# Patient Record
Sex: MALE | Race: WHITE | NOT HISPANIC OR LATINO | Employment: FULL TIME | URBAN - METROPOLITAN AREA
[De-identification: names, ages, dates, MRNs, and addresses within clinical notes are randomized per-mention and may not be internally consistent; named-entity substitution may affect disease eponyms.]

---

## 2022-10-18 NOTE — TELEPHONE ENCOUNTER
FUTURE VISIT INFORMATION      FUTURE VISIT INFORMATION:    Date: 12/20/2022    Time: 3 PM     Location: [facility]   REFERRAL INFORMATION:    Referring provider:      Referring providers clinic:      Reason for visit/diagnosis  New per pat.  Requested referral from current SLP provider for dysphonia/difficulty speaking.  Med recs with [facility].  Confirmed [facility] location    RECORDS REQUESTED FROM:       Clinic name Comments Records Status Imaging Status   [facility] ([facility])  12/15/2021 Office visit with ALYSSA Bragg CNP    Care Everywhere    [facility]  9/21/2021 Office visit with Dr. Joyce Franco Care Everywhere    [facility]  2/7/19 Office visit with Gab Marcano PA-C Care Everywhere    [facility] 10/4/22, 9/13/22 Office note from Glory Guerrero MS,CCC-SLP Care Everywere

## 2022-12-19 NOTE — PROGRESS NOTES
[facility]   at the [facility]   Otolaryngology Clinic     Patient: Ravin Yañez    MRN: [id number]    : 1990    Age/Gender: 32 year old male  Date of Service: 2022  Rendering Provider:   Regina Reyes MD     Referring Provider   PCP: Liliana Zamora  Reason for Consultation   Dysphonia  Dysphagia  History   HISTORY OF PRESENT ILLNESS: Mr. Yañez is a 32 year old male who presents to us today with dysphonia and dysphagia.      Of note pertinent medical history significant for seizures, GERD, anxiety, depression, alcohol use disorder with alcoholic hepatitis and alcohol withdrawal delirium, and tobacco use disorder.    he presents today for evaluation. he reports:    Dysphonia  - good voice day  - yesterday it was completely gone  - tenses up  - has to strain to say more than a few words  - had couple of session with SLP in [place] which seemed minimally helpful  - started when he was 19 and gradually worsened  - no illness, trauma, or surgery prior to onset  - impacting work life and social life  - desperate for improvement  - grandmother had laryngeal dystonia  - on good days, voice is very deep, but sometimes he needs to speak with a higher pitch when he feels tension  - has always had slight tremor of body  - effort to speak is 3/10 today  - singing is harder than speaking    Dysphagia  - started less than one year ago  - occasionally, second bite of food gets stuck nursing home down throat  - has to force it the rest of the way  - happens once a month  - stops eating for a few minutes then takes very small bites until he overcomes it  - happens with solids  - no problems with liquids  - ulnar nerve issue in right hand  - working with neurologist  - no troubles walking    Dyspnea  - no asthma, no inhalers    Throat clearing/cough  - denies    GERD/LPRD   - took omeprazole and changed diet for few months and did not noticed improvement in voice    PAST MEDICAL  HISTORY: No past medical history on file.    PAST SURGICAL HISTORY: No past surgical history on file.    CURRENT MEDICATIONS:   Current Outpatient Medications:      desvenlafaxine (PRISTIQ) 100 MG 24 hr tablet, Take 100 mg by mouth every morning, Disp: , Rfl:      gabapentin (NEURONTIN) 300 MG capsule, TAKE ONE CAPSULE BY MOUTH FOUR TIMES A DAY, Disp: , Rfl:      SUBOXONE 8-2 MG per film, , Disp: , Rfl:     ALLERGIES: Patient has no known allergies.    SOCIAL HISTORY:    Social History     Socioeconomic History     Marital status: Single     Spouse name: Not on file     Number of children: Not on file     Years of education: Not on file     Highest education level: Not on file   Occupational History     Not on file   Tobacco Use     Smoking status: Not on file     Smokeless tobacco: Not on file   Substance and Sexual Activity     Alcohol use: Not on file     Drug use: Not on file     Sexual activity: Not on file   Other Topics Concern     Not on file   Social History Narrative     Not on file     Social Determinants of Health     Financial Resource Strain: Not on file   Food Insecurity: Not on file   Transportation Needs: Not on file   Physical Activity: Not on file   Stress: Not on file   Social Connections: Not on file   Intimate Partner Violence: Not on file   Housing Stability: Not on file         FAMILY HISTORY: No family history on file.  Non-contributory for problems with anesthesia    REVIEW OF SYSTEMS:   The patient was asked a 14 point review of systems regarding constitutional symptoms, eye symptoms, ears, nose, mouth, throat symptoms, cardiovascular symptoms, respiratory symptoms, gastrointestinal symptoms, genitourinary symptoms, musculoskeletal symptoms, integumentary symptoms, neurological symptoms, psychiatric symptoms, endocrine symptoms, hematologic/lymphatic symptoms, and allergic/ immunologic symptoms.   The pertinent factors have been included in the HPI and below.  Patient Supplied Answers to  Review of Systems   ENT ROS 12/20/2022   Constitutional: Unexplained fatigue   Neurology: Unexplained weakness   Psychology: Frequently feeling anxious   Ears, Nose, Throat: Sore throat, Trouble swallowing, Hoarseness   Gastrointestinal/Genitourinary: Constipation       Physical Examination   The patient underwent a physical examination as described below. The pertinent positive and negative findings are summarized after the description of the examination.  Constitutional: The patient's developmental and nutritional status was assessed. The patient's voice quality was assessed.  Head and Face: The head and face were inspected for deformities. The sinuses were palpated. The salivary glands were palpated. Facial muscle strength was assessed bilaterally.  Eyes: Extraocular movements and primary gaze alignment were assessed.  Ears, Nose, Mouth and Throat: The ears and nose were examined for deformities. The nasal septum, mucosa, and turbinates were inspected by anterior rhinoscopy. The lips, teeth, and gums were examined for abnormalities. The oral mucosa, tongue, palate, tonsils, lateral and posterior pharynx were inspected for the presence of asymmetry or mucosal lesions.    Neck: The tracheal position was noted, and the neck mass palpated to determine if there were any asymmetries, abnormal neck masses, thyromegally, or thyroid nodules.  Respiratory: The nature of the breathing and chest expansion/symmetry was observed.  Cardiovascular: The patient was examined to determine the presence of any edema or jugular venous distension.  Abdomen: The contour of the abdomen was noted.  Lymphatic: The patient was examined for infraclavicular lymphadenopathy.  Musculoskeletal: The patient was inspected for the presence of skeletal deformities.  Extremities: The extremities were examined for any clubbing or cyanosis.  Skin: The skin was examined for inflammatory or neoplastic conditions.  Neurologic: The patient's orientation,  mood, and affect were noted. The cranial nerve  functions were examined.  Other pertinent positive and negative findings on physical examination:      OC/OP: no lesions, uvula midline, soft palate elevates symmetrically   Neck: no lesions, no TH tenderness to palpation     All other physical examination findings were within normal limits and noncontributory.  Procedures   Video Laryngoscopy with Stroboscopy (CPT 53047)    Preoperative Diagnosis:  Dysphonia and throat symptoms  Postoperative Diagnosis: Dysphonia and throat symptoms  Indication:  Patient has new or persistent dysphonia and throat symptoms.  This requires evaluation by stroboscopy to fully delineate the laryngeal functioning; especially mucosal wave assessment, ultrasharp visualization of lesions on the vocal folds, and overall functioning of the larynx.  Details of Procedure: A 70 degree rigid telescopic laryngoscope or a distal chip flexible scope was used. The lighting was obtained via a light cable connected to a stroboscopic unit. The telescope was inserted either transorally or transnasally until the vocal folds could be visualized. The patient was instructed to sustain the vowel  ee  at a comfortable pitch and loudness as the voice was monitored by a microphone connected to a fundamental frequency tracker. This circuit tracked vocal periodicity, allowing the light to flash in synchrony with the glottal cycles. A setting on the stroboscope was set to change the phase of light strobing with relation to the vocal fundamental frequency, producing an image of 1 to 2 glottal cycles every second. The video images were recorded for analysis. Use of the variable speed, slow and stop scan allowed careful study of pertinent segments of laryngeal function to increase accuracy of clinical assessments of function and dysfunction.  In particular, features of glottal closure, mucosal wave on the vocal fold cover and laryngeal symmetry were analyzed. Lastly, the  patient was asked to phonate speech samples and auditory/perceptual evaluation of voice and resonance were performed.  The vocal quality was carefully evaluated for hoarseness, breathiness, loudness, phrase length and intelligibility to determine the source of dysphonia.    Findings:      B. LARYNGOVIDEOSTROBOSCOPY   Anatomic/Physiological Deviations:  LNC, intention tremor with phonation, supraglottic hyperfunction  Mucosal wave: Right:  No restriction     Left: No restriction  Bilateral Vocal Fold Vibration: Symmetric  Vocal Process: Right: No restriction    Left:  No restriction  Vocal Fold closure: Complete glottal closure    Complication(s): None  Disposition: Patient tolerated the procedure well          Fiberoptic Endoscopic Evaluation of Swallowing (CPT 71199)  and Interpretation of Swallowing (CPT 82550)    Indications: See above notes for complete history and physical. Patient complains of dysphagia to solids and/or there is suggestion on history and endoscopic exam of the presence of dysphagia causing medical complaints.  Swallowing evaluation is being performed to assess the presence and degree of dysphagia, and to recommend a safe diet.     Pulmonary Status:  No PNA   Current Diet:              regular                                        thin liquids      Consistency Amounts:  Thin Liquid: sip   Puree: 1 tsp  Solid: cookies            Positioning: upright in a chair  Oral Peripheral Exam: See physical exam section.  Anatomic Notes: See Videostroboscopy report for assessment of anatomy and laryngeal functioning  Pharyngeal secretions prior to administration of liquid or food: No  Oral Phase Abnormal Findings: No abnormal behavior observed  Behavioral Adaptations: No abnormal behavior observed  Pharyngeal Phase Abnormal Findings: no penetration, no aspiration    Recommended Diet:  regular                                        thin liquids             Review of Relevant Clinical Data   I personally  reviewed:  Notes: Glory Guerrero MS,CCC-SLP 10/4/22, 9/13/22  History:  Patient is a 32 Y male who presents with dysphonia. Pt has not seen an ENT recently regarding this voice problem. Patient reports having an unpredictable voice. Sometimes he doesn't have any voice. He has adapted by avoiding some conversations because it takes so much effort to use his voice. Voice is worse on the phone. He feels he has better voice when he yells or whispers. Voice is worse as the day progresses. Patient reported the voice changes began approximately 13 years ago when he was age 19. Patient questions if he may have laryngeal dystonia; his paternal grandmother had this diagnosis and was eventually unable to talk due to the diagnosis.   Impression:  Upon evaluation today, patient presented with moderate voice problem. Differential diagnosis includes muscle tension dysphonia, vocal tremor, and spasmodic dysphonia. Suspect patient may have a combination of the diagnoses. He was stimulable for improved vocal quality with trials using a more breathy vocal quality. Prognosis for improvement in vocal function is fair, if patient consistently attends treatment, increases vocal hygiene behaviors, and complies with home program.  Rehabilitation potential: Good , if patient consistently attends treatment to learn and use compensatory techniques and to receive direct treatment of deficit areas.  Barriers to Rehabilitation: None    Joyce Franco MD 9/21/21  Ravin Yañez is a 31 Y male presenting to Urgent Care today for evaluation of a change in his voice x several weeks. Patient states that he gets this periodically but they usually goes away. He reports having an endoscopy in the past and has had antibiotics and antifungal prescriptions with temporary relief. Patient adds that his last bout of these symptoms lasted for approximately 2.5 months.     Radiology:    Pathology:    Procedures:    Labs:  No results found for: TSH  No  "results found for: NA, CO2, BUN, CREAT, GLUCOSE, PHOS  No results found for: WBC, HGB, HCT, MCV, PLT  No results found for: PT, PTT, INR  No results found for: KEREN  No components found for: RHEUMATOIDFACTOR,  RF  No results found for: CRP  No components found for: CKTOT, URICACID  No components found for: C3, C4, DSDNAAB, NDNAABIFA  No results found for: MPOAB    Patient reported Quality of Life (QOL) Measures   Patient Supplied Answers To VHI Questionnaire  Voice Handicap Index (VHI-10) 12/20/2022   My voice makes it difficult for people to hear me 2   People have difficulty understanding me in a noisy room 2   My voice difficulties restrict my personal and social life.  4   I feel left out of conversations because of my voice 4   My voice problem causes me to lose income 3   I feel as though I have to strain to produce voice 4   The clarity of my voice is unpredictable 4   My voice problem upsets me 4   My voice makes me feel handicapped 4   People ask, \"What's wrong with your voice?\" 2   VHI-10 33         Patient Supplied Answers To EAT Questionnaire  Eating Assessment Tool (EAT-10) 12/20/2022   My swallowing problem has caused me to lose weight 0   My swallowing problem interferes with my ability to go out for meals 0   Swallowing liquids takes extra effort 0   Swallowing solids takes extra effort 2   Swallowing pills takes extra effort 0   Swallowing is painful 0   The pleasure of eating is affected by my swallowing 2   When I swallow food sticks in my throat 0   I cough when I eat 0   Swallowing is stressful 1   EAT-10 5         Patient Supplied Answers To CSI Questionnaire  Cough Severity Index (CSI) 12/20/2022   My cough is worse when I lie down 0   My coughing problem causes me to restrict my personal and social life 0   I tend to avoid places because of my cough problem 0   I feel embarrassed because of my coughing problem 0   People ask, ''What's wrong?'' because I cough a lot 0   I run out of air when I " cough 0   My coughing problem affects my voice 0   My coughing problem limits my physical activity 0   My coughing problem upsets me 0   People ask me if I am sick because I cough a lot 0   CSI Score 0         @[email or web address]@    Impression & Plan     IMPRESSION: Mr. Yañez is a 32 year old male who is being seen for the followin. Dysphonia  - feels tension and difficulty speaking  - onset 13 years ago and gradually worsening since  - good days and bad days  - feels that it is impacting his work and social life  - scope shows intention tremor with phonation, supraglottic hyperfunction  - symptoms due to muscle tension dysphonia and intention tremor on phonation  - discussed starting with voice therapy  Plan  - voice therapy    2. Dysphagia  - feels food get stuck once a month early in meals while eating solids  - ongoing for 1 year  - FEES evaluation shows no penetration no aspiration  - symptoms due to muscle tension dysphagia  - recommended therapy  Plan  - voice therapy    RETURN VISIT: as needed after therapy    Thank you for the kind referral and for allowing me to share in the care of Mr. Yañez. If you have any questions, please do not hesitate to contact me.    Scribe Disclosure:  I, Emile Dinh, am serving as a scribe to document services personally performed by Regina Reyes MD based on data collection and the provider's statements to me.     Regina Reyes MD    Laryngology    [facility]  Department of  Otolaryngology - Head and Neck Surgery  Clinics & Surgery Center  [address] [place] [id number]  Appointment line: [phone]  Fax: [phone]  [email or web address]   Answers for HPI/ROS submitted by the patient on 2022  VPCMEAN: 1.88

## 2022-12-20 ENCOUNTER — OFFICE VISIT (OUTPATIENT)
Dept: OTOLARYNGOLOGY | Facility: CLINIC | Age: 32
End: 2022-12-20
Payer: COMMERCIAL

## 2022-12-20 ENCOUNTER — PRE VISIT (OUTPATIENT)
Dept: OTOLARYNGOLOGY | Facility: CLINIC | Age: 32
End: 2022-12-20

## 2022-12-20 ENCOUNTER — THERAPY VISIT (OUTPATIENT)
Dept: SPEECH THERAPY | Facility: CLINIC | Age: 32
End: 2022-12-20
Payer: COMMERCIAL

## 2022-12-20 VITALS
WEIGHT: 242 LBS | OXYGEN SATURATION: 100 % | RESPIRATION RATE: 16 BRPM | BODY MASS INDEX: 30.09 KG/M2 | HEART RATE: 63 BPM | TEMPERATURE: 98.2 F | DIASTOLIC BLOOD PRESSURE: 78 MMHG | SYSTOLIC BLOOD PRESSURE: 125 MMHG | HEIGHT: 75 IN

## 2022-12-20 DIAGNOSIS — J38.7 LARYNGEAL HYPERFUNCTION: ICD-10-CM

## 2022-12-20 DIAGNOSIS — R13.10 DYSPHAGIA, UNSPECIFIED TYPE: Primary | ICD-10-CM

## 2022-12-20 DIAGNOSIS — R49.0 DYSPHONIA: ICD-10-CM

## 2022-12-20 DIAGNOSIS — R13.12 OROPHARYNGEAL DYSPHAGIA: Primary | ICD-10-CM

## 2022-12-20 DIAGNOSIS — R49.0 DYSPHONIA: Primary | ICD-10-CM

## 2022-12-20 PROCEDURE — 92612 ENDOSCOPY SWALLOW (FEES) VID: CPT | Mod: GN | Performed by: OTOLARYNGOLOGY

## 2022-12-20 PROCEDURE — 31579 LARYNGOSCOPY TELESCOPIC: CPT | Mod: 51 | Performed by: OTOLARYNGOLOGY

## 2022-12-20 PROCEDURE — 92524 BEHAVRAL QUALIT ANALYS VOICE: CPT | Mod: GN | Performed by: SPEECH-LANGUAGE PATHOLOGIST

## 2022-12-20 PROCEDURE — 92610 EVALUATE SWALLOWING FUNCTION: CPT | Mod: GN | Performed by: SPEECH-LANGUAGE PATHOLOGIST

## 2022-12-20 PROCEDURE — 92613 ENDOSCOPY SWALLOW (FEES) I&R: CPT | Performed by: OTOLARYNGOLOGY

## 2022-12-20 PROCEDURE — 99204 OFFICE O/P NEW MOD 45 MIN: CPT | Mod: 25 | Performed by: OTOLARYNGOLOGY

## 2022-12-20 RX ORDER — BUPRENORPHINE HYDROCHLORIDE, NALOXONE HYDROCHLORIDE 8; 2 MG/1; MG/1
FILM, SOLUBLE BUCCAL; SUBLINGUAL
COMMUNITY
Start: 2022-12-17

## 2022-12-20 RX ORDER — GABAPENTIN 300 MG/1
CAPSULE ORAL
COMMUNITY
Start: 2022-12-07

## 2022-12-20 RX ORDER — DESVENLAFAXINE 100 MG/1
100 TABLET, EXTENDED RELEASE ORAL EVERY MORNING
COMMUNITY
Start: 2022-12-07

## 2022-12-20 ASSESSMENT — PAIN SCALES - GENERAL: PAINLEVEL: NO PAIN (0)

## 2022-12-20 NOTE — LETTER
2022       RE: Ravin Yañez  [address]  [place] [address]     Dear Colleague,    Thank you for referring your patient, Ravin Yañez, to the [facility]. Please see a copy of my visit note below.        [facility]   at the [facility]   Otolaryngology Clinic     Patient: Ravin Yañez    MRN: [id number]    : 1990    Age/Gender: 32 year old male  Date of Service: 2022  Rendering Provider:   Regina Reyes MD     Referring Provider   PCP: Liliana Zamora  Reason for Consultation   Dysphonia  Dysphagia  History   HISTORY OF PRESENT ILLNESS: Mr. Yañez is a 32 year old male who presents to us today with dysphonia and dysphagia.      Of note pertinent medical history significant for seizures, GERD, anxiety, depression, alcohol use disorder with alcoholic hepatitis and alcohol withdrawal delirium, and tobacco use disorder.    he presents today for evaluation. he reports:    Dysphonia  - good voice day  - yesterday it was completely gone  - tenses up  - has to strain to say more than a few words  - had couple of session with SLP in [place] which seemed minimally helpful  - started when he was 19 and gradually worsened  - no illness, trauma, or surgery prior to onset  - impacting work life and social life  - desperate for improvement  - grandmother had laryngeal dystonia  - on good days, voice is very deep, but sometimes he needs to speak with a higher pitch when he feels tension  - has always had slight tremor of body  - effort to speak is 3/10 today  - singing is harder than speaking    Dysphagia  - started less than one year ago  - occasionally, second bite of food gets stuck group home down throat  - has to force it the rest of the way  - happens once a month  - stops eating for a few minutes then takes very small bites until he overcomes  it  - happens with solids  - no problems with liquids  - ulnar nerve issue in right hand  - working with neurologist  - no troubles walking    Dyspnea  - no asthma, no inhalers    Throat clearing/cough  - denies    GERD/LPRD   - took omeprazole and changed diet for few months and did not noticed improvement in voice    PAST MEDICAL HISTORY: No past medical history on file.    PAST SURGICAL HISTORY: No past surgical history on file.    CURRENT MEDICATIONS:   Current Outpatient Medications:      desvenlafaxine (PRISTIQ) 100 MG 24 hr tablet, Take 100 mg by mouth every morning, Disp: , Rfl:      gabapentin (NEURONTIN) 300 MG capsule, TAKE ONE CAPSULE BY MOUTH FOUR TIMES A DAY, Disp: , Rfl:      SUBOXONE 8-2 MG per film, , Disp: , Rfl:     ALLERGIES: Patient has no known allergies.    SOCIAL HISTORY:    Social History     Socioeconomic History     Marital status: Single     Spouse name: Not on file     Number of children: Not on file     Years of education: Not on file     Highest education level: Not on file   Occupational History     Not on file   Tobacco Use     Smoking status: Not on file     Smokeless tobacco: Not on file   Substance and Sexual Activity     Alcohol use: Not on file     Drug use: Not on file     Sexual activity: Not on file   Other Topics Concern     Not on file   Social History Narrative     Not on file     Social Determinants of Health     Financial Resource Strain: Not on file   Food Insecurity: Not on file   Transportation Needs: Not on file   Physical Activity: Not on file   Stress: Not on file   Social Connections: Not on file   Intimate Partner Violence: Not on file   Housing Stability: Not on file         FAMILY HISTORY: No family history on file.  Non-contributory for problems with anesthesia    REVIEW OF SYSTEMS:   The patient was asked a 14 point review of systems regarding constitutional symptoms, eye symptoms, ears, nose, mouth, throat symptoms, cardiovascular symptoms, respiratory  symptoms, gastrointestinal symptoms, genitourinary symptoms, musculoskeletal symptoms, integumentary symptoms, neurological symptoms, psychiatric symptoms, endocrine symptoms, hematologic/lymphatic symptoms, and allergic/ immunologic symptoms.   The pertinent factors have been included in the HPI and below.  Patient Supplied Answers to Review of Systems  [facility] ENT ROS 12/20/2022   Constitutional: Unexplained fatigue   Neurology: Unexplained weakness   Psychology: Frequently feeling anxious   Ears, Nose, Throat: Sore throat, Trouble swallowing, Hoarseness   Gastrointestinal/Genitourinary: Constipation       Physical Examination   The patient underwent a physical examination as described below. The pertinent positive and negative findings are summarized after the description of the examination.  Constitutional: The patient's developmental and nutritional status was assessed. The patient's voice quality was assessed.  Head and Face: The head and face were inspected for deformities. The sinuses were palpated. The salivary glands were palpated. Facial muscle strength was assessed bilaterally.  Eyes: Extraocular movements and primary gaze alignment were assessed.  Ears, Nose, Mouth and Throat: The ears and nose were examined for deformities. The nasal septum, mucosa, and turbinates were inspected by anterior rhinoscopy. The lips, teeth, and gums were examined for abnormalities. The oral mucosa, tongue, palate, tonsils, lateral and posterior pharynx were inspected for the presence of asymmetry or mucosal lesions.    Neck: The tracheal position was noted, and the neck mass palpated to determine if there were any asymmetries, abnormal neck masses, thyromegally, or thyroid nodules.  Respiratory: The nature of the breathing and chest expansion/symmetry was observed.  Cardiovascular: The patient was examined to determine the presence of any edema or jugular venous distension.  Abdomen: The contour of the abdomen was  noted.  Lymphatic: The patient was examined for infraclavicular lymphadenopathy.  Musculoskeletal: The patient was inspected for the presence of skeletal deformities.  Extremities: The extremities were examined for any clubbing or cyanosis.  Skin: The skin was examined for inflammatory or neoplastic conditions.  Neurologic: The patient's orientation, mood, and affect were noted. The cranial nerve  functions were examined.  Other pertinent positive and negative findings on physical examination:      OC/OP: no lesions, uvula midline, soft palate elevates symmetrically   Neck: no lesions, no TH tenderness to palpation     All other physical examination findings were within normal limits and noncontributory.  Procedures   Video Laryngoscopy with Stroboscopy (CPT 44953)    Preoperative Diagnosis:  Dysphonia and throat symptoms  Postoperative Diagnosis: Dysphonia and throat symptoms  Indication:  Patient has new or persistent dysphonia and throat symptoms.  This requires evaluation by stroboscopy to fully delineate the laryngeal functioning; especially mucosal wave assessment, ultrasharp visualization of lesions on the vocal folds, and overall functioning of the larynx.  Details of Procedure: A 70 degree rigid telescopic laryngoscope or a distal chip flexible scope was used. The lighting was obtained via a light cable connected to a stroboscopic unit. The telescope was inserted either transorally or transnasally until the vocal folds could be visualized. The patient was instructed to sustain the vowel  ee  at a comfortable pitch and loudness as the voice was monitored by a microphone connected to a fundamental frequency tracker. This circuit tracked vocal periodicity, allowing the light to flash in synchrony with the glottal cycles. A setting on the stroboscope was set to change the phase of light strobing with relation to the vocal fundamental frequency, producing an image of 1 to 2 glottal cycles every second. The video  images were recorded for analysis. Use of the variable speed, slow and stop scan allowed careful study of pertinent segments of laryngeal function to increase accuracy of clinical assessments of function and dysfunction.  In particular, features of glottal closure, mucosal wave on the vocal fold cover and laryngeal symmetry were analyzed. Lastly, the patient was asked to phonate speech samples and auditory/perceptual evaluation of voice and resonance were performed.  The vocal quality was carefully evaluated for hoarseness, breathiness, loudness, phrase length and intelligibility to determine the source of dysphonia.    Findings:      B. LARYNGOVIDEOSTROBOSCOPY   Anatomic/Physiological Deviations:  LNC, intention tremor with phonation, supraglottic hyperfunction  Mucosal wave: Right:  No restriction     Left: No restriction  Bilateral Vocal Fold Vibration: Symmetric  Vocal Process: Right: No restriction    Left:  No restriction  Vocal Fold closure: Complete glottal closure    Complication(s): None  Disposition: Patient tolerated the procedure well          Fiberoptic Endoscopic Evaluation of Swallowing (CPT 69070)  and Interpretation of Swallowing (CPT 71778)    Indications: See above notes for complete history and physical. Patient complains of dysphagia to solids and/or there is suggestion on history and endoscopic exam of the presence of dysphagia causing medical complaints.  Swallowing evaluation is being performed to assess the presence and degree of dysphagia, and to recommend a safe diet.     Pulmonary Status:  No PNA   Current Diet:              regular                                        thin liquids      Consistency Amounts:  Thin Liquid: sip   Puree: 1 tsp  Solid: cookies            Positioning: upright in a chair  Oral Peripheral Exam: See physical exam section.  Anatomic Notes: See Videostroboscopy report for assessment of anatomy and laryngeal functioning  Pharyngeal secretions prior to  administration of liquid or food: No  Oral Phase Abnormal Findings: No abnormal behavior observed  Behavioral Adaptations: No abnormal behavior observed  Pharyngeal Phase Abnormal Findings: no penetration, no aspiration    Recommended Diet:  regular                                        thin liquids             Review of Relevant Clinical Data   I personally reviewed:  Notes: Glory Guerrero MS,CCC-SLP 10/4/22, 9/13/22  History:  Patient is a 32 Y male who presents with dysphonia. Pt has not seen an ENT recently regarding this voice problem. Patient reports having an unpredictable voice. Sometimes he doesn't have any voice. He has adapted by avoiding some conversations because it takes so much effort to use his voice. Voice is worse on the phone. He feels he has better voice when he yells or whispers. Voice is worse as the day progresses. Patient reported the voice changes began approximately 13 years ago when he was age 19. Patient questions if he may have laryngeal dystonia; his paternal grandmother had this diagnosis and was eventually unable to talk due to the diagnosis.   Impression:  Upon evaluation today, patient presented with moderate voice problem. Differential diagnosis includes muscle tension dysphonia, vocal tremor, and spasmodic dysphonia. Suspect patient may have a combination of the diagnoses. He was stimulable for improved vocal quality with trials using a more breathy vocal quality. Prognosis for improvement in vocal function is fair, if patient consistently attends treatment, increases vocal hygiene behaviors, and complies with home program.  Rehabilitation potential: Good , if patient consistently attends treatment to learn and use compensatory techniques and to receive direct treatment of deficit areas.  Barriers to Rehabilitation: None    Joyce Franco MD 9/21/21  Ravin Yañez is a 31 Y male presenting to Urgent Care today for evaluation of a change in his voice x several weeks.  "Patient states that he gets this periodically but they usually goes away. He reports having an endoscopy in the past and has had antibiotics and antifungal prescriptions with temporary relief. Patient adds that his last bout of these symptoms lasted for approximately 2.5 months.     Radiology:    Pathology:    Procedures:    Labs:  No results found for: TSH  No results found for: NA, CO2, BUN, CREAT, GLUCOSE, PHOS  No results found for: WBC, HGB, HCT, MCV, PLT  No results found for: PT, PTT, INR  No results found for: KEREN  No components found for: RHEUMATOIDFACTOR,  RF  No results found for: CRP  No components found for: CKTOT, URICACID  No components found for: C3, C4, DSDNAAB, NDNAABIFA  No results found for: MPOAB    Patient reported Quality of Life (QOL) Measures   Patient Supplied Answers To VHI Questionnaire  Voice Handicap Index (VHI-10) 12/20/2022   My voice makes it difficult for people to hear me 2   People have difficulty understanding me in a noisy room 2   My voice difficulties restrict my personal and social life.  4   I feel left out of conversations because of my voice 4   My voice problem causes me to lose income 3   I feel as though I have to strain to produce voice 4   The clarity of my voice is unpredictable 4   My voice problem upsets me 4   My voice makes me feel handicapped 4   People ask, \"What's wrong with your voice?\" 2   VHI-10 33         Patient Supplied Answers To EAT Questionnaire  Eating Assessment Tool (EAT-10) 12/20/2022   My swallowing problem has caused me to lose weight 0   My swallowing problem interferes with my ability to go out for meals 0   Swallowing liquids takes extra effort 0   Swallowing solids takes extra effort 2   Swallowing pills takes extra effort 0   Swallowing is painful 0   The pleasure of eating is affected by my swallowing 2   When I swallow food sticks in my throat 0   I cough when I eat 0   Swallowing is stressful 1   EAT-10 5         Patient Supplied Answers " To CSI Questionnaire  Cough Severity Index (CSI) 2022   My cough is worse when I lie down 0   My coughing problem causes me to restrict my personal and social life 0   I tend to avoid places because of my cough problem 0   I feel embarrassed because of my coughing problem 0   People ask, ''What's wrong?'' because I cough a lot 0   I run out of air when I cough 0   My coughing problem affects my voice 0   My coughing problem limits my physical activity 0   My coughing problem upsets me 0   People ask me if I am sick because I cough a lot 0   CSI Score 0         @[email or web address]@    Impression & Plan     IMPRESSION: Mr. Yañez is a 32 year old male who is being seen for the followin. Dysphonia  - feels tension and difficulty speaking  - onset 13 years ago and gradually worsening since  - good days and bad days  - feels that it is impacting his work and social life  - scope shows intention tremor with phonation, supraglottic hyperfunction  - symptoms due to muscle tension dysphonia and intention tremor on phonation  - discussed starting with voice therapy  Plan  - voice therapy    2. Dysphagia  - feels food get stuck once a month early in meals while eating solids  - ongoing for 1 year  - FEES evaluation shows no penetration no aspiration  - symptoms due to muscle tension dysphagia  - recommended therapy  Plan  - voice therapy    RETURN VISIT: as needed after therapy    Thank you for the kind referral and for allowing me to share in the care of Mr. Yañez. If you have any questions, please do not hesitate to contact me.    Scribe Disclosure:  I, Emile Dinh, am serving as a scribe to document services personally performed by Regina eRyes MD based on data collection and the provider's statements to me.     Regina Reyes MD    Laryngology    [facility]  Department of  Otolaryngology - Head and Neck Surgery  Clinics & Surgery Center  [address] [place] [address],  [place] [address]  Appointment line: [phone]  Fax: [phone]  [email or web address]   Answers for HPI/ROS submitted by the patient on 12/20/2022  VPCMEAN: 1.88

## 2022-12-20 NOTE — PROGRESS NOTES
"[facility]  Clinical Voice and Upper Airway Evaluation Report    Patient's Name: Ravin Yañez  Date of Evaluation: 12/20/2022  Providing SLP: Aurora Velasquez MS CCC-SLP  Seen in Conjunction With: Regina Reyes MD - Tri Abreu, CCC-SLP  Referring Provider and Facility: Glory Nashmelidagary, MS [facility]-SLP - [facility] in [place]  Insurance Coverage: [facility]  Chief Complaint: Dysphonia  Assessment / Treatment Time: 1 hour  Evaluation Location: [facility] [place]      Patient History: Ravin is a 32-year-old male who presents today for evaluation of dysphonia.     Dysphonia:     Onset: age 19 - denies inciting factor    Family history: grandmother has laryngeal dystonia - Ravin reports that she sounded \"shaky\"    Complaints  o Tense, strained throat  o Sometimes sounds breathy and raspy  o Needs to speak monotone  o More difficulties speaking and increased effort  o 80% of the time  o Voice was almost gone the other day; however, it is doing well today  o Notes that it can be completely normal a few days per year  o Highly fluctuating  o Worse later in the day and on the phone  o Whispering is very hard  o Singing is difficult  o Sounds better when speaking loudly  o Burning, aching sensation in throat after a certain amount of voice use  o Sometimes feels like he can control his voice    Previous treatments  o [place] [facility] provided reflux medications - not helpful - Ravin never had reflux symptoms in the first place  o Worked with SLP in [place]  - Vocal tremor noted with significant strain noted  - Worked on easy onsets and felt that this was very mildly helpful  - Attended only 2 sessions    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    Dyspnea: denies issues or pulmonary changes    Dysphagia:     About 1 year ago    Rare instances (monthly) where swallow \"reflex will feel interrupted\"     Occurs more with solids     Will take a break from eating and go back to " "it    No dysphagia with liquids    Cough / Throat Clearing: denies    Alcohol Intake: history of multiple ED visits for intoxication    Occupation / School Status: works in construction and does very little talking - previously worked in position which required talking on the phones frequently        Quality of Life Questionnaires:    Patient Supplied Answers To Last 2 VHI Questionnaires  Voice Handicap Index (VHI-10) 12/20/2022   My voice makes it difficult for people to hear me 2   People have difficulty understanding me in a noisy room 2   My voice difficulties restrict my personal and social life.  4   I feel left out of conversations because of my voice 4   My voice problem causes me to lose income 3   I feel as though I have to strain to produce voice 4   The clarity of my voice is unpredictable 4   My voice problem upsets me 4   My voice makes me feel handicapped 4   People ask, \"What's wrong with your voice?\" 2   VHI-10 33     Patient Supplied Answers To Last 2 CSI Questionnaires  Cough Severity Index (CSI) 12/20/2022   My cough is worse when I lie down 0   My coughing problem causes me to restrict my personal and social life 0   I tend to avoid places because of my cough problem 0   I feel embarrassed because of my coughing problem 0   People ask, ''What's wrong?'' because I cough a lot 0   I run out of air when I cough 0   My coughing problem affects my voice 0   My coughing problem limits my physical activity 0   My coughing problem upsets me 0   People ask me if I am sick because I cough a lot 0   CSI Score 0     Patient Supplied Answers To Last 2 EAT Questionnaires  Eating Assessment Tool (EAT-10) 12/20/2022   My swallowing problem has caused me to lose weight 0   My swallowing problem interferes with my ability to go out for meals 0   Swallowing liquids takes extra effort 0   Swallowing solids takes extra effort 2   Swallowing pills takes extra effort 0   Swallowing is painful 0   The pleasure of " "eating is affected by my swallowing 2   When I swallow food sticks in my throat 0   I cough when I eat 0   Swallowing is stressful 1   EAT-10 5       Perceptual Analysis (04021): Evaluation of Voice / Speech / Non-Communicative Laryngeal Behaviors    The GRBAS is a perceptual rating of voice change. 0 indicated no impairment, 3 indicates a severe impairment. \"C\" and \"I\" may be used to signify if these feature was observed consistently or intermittently respectively. This is a rating based on clinical judgement of disordered voice quality.  G ( 1 - C ) General Dysphonia     R ( 1 - C ) Roughness     B ( 0 - C ) Breathiness     A ( 0 - C ) Asthenia     S ( 1 - C ) Strain  Additional information: mild glottal ma noted during connected speech      Stimuli for differential diagnosis of spasmodic dysphonia and vocal tremor:    Sustained vowel:     No tremor detected during sustained /i/ and /a/ for several seconds    Voiced-loaded stimuli (e.g. \"We were away a year ago,\" counting from 80-90):    Reports that counting from 80-90 felt easier than 50-60    No task-specificity/tremor/spasms noted    Voiceless-loaded stimuli (e.g. \"How hard did he hit him?,\" counting from 50-60):    Reports that CAPE-V sentence \"How hard did he hit him?\" felt easier than the others    No task-specificity/tremor/spasms noted    Singing:    Voice quality was similar to connected speech with no tremor or spasming noted    *Validity of this measure may be slightly reduced when performed via acoustic signal from virtual visit*    Laryngeal palpation:     Thyrohyoid space: denies tenderness    Additional observations:     Coughing / throat clearing: some mild instances noted during voice tasks    Breathing patterns: appropriate    Overt tension: WNL    Habitual pitch: slightly lowered compared to age- and gender-matched peers    Pitch range: appropriate for age- and gender-matched peers    Resonance: mildly back-focused    Loudness: slightly " "increased      Laryngoscopy with stroboscopy:    Provider performing exam: Regina Reyes MD    Informed consent: Informed consent was obtained, which includes potential side-effects, risks, and benefits of the procedure.     Anesthetic: Topical anesthesia with 0.25% phenylephrine was applied the nostrils bilaterally. Viscous lidocaine 4% was applied to the tip of the endoscope.    Scope type: A distal chip flexible laryngoscope was passed through the nare with halogen and xenon light source(s).     The laryngeal and pharyngeal structures were evaluated for gross appearance, mobility, function, and focal lesions / abnormalities of the associated mucosa.    R Arytenoid Abduction / Adduction: symmetrical and timely ab/adduction with appropriate range of motion    L Arytenoid Abduction / Adduction: \" \"    Vocal Fold Elongation: appropriate    Subglottis Appearance: WNL    Mediolateral Compression: mild with connected speech    Anteroposterior Compression: noted with lower pitches    Appearance: mild, rapid shaking of the larynx noted, consistent with tension tremor; mild omega-shaped epiglottis frequently canted slightly to the R    Secretions: appropriate    Left (L) Vocal Fold Edge: mild, diffuse erythema of the superior surface    Right (R) Vocal Fold Edge: \" \"    Narrow Band Imaging: WNL    Glottic Closure: complete    Phase Closure: predominantly closed phase    Vertical Level of Approximation: WNL    L Amplitude: reduced due to straining    R Amplitude: \" \"    L Mucosal Wave:  WNL    R Mucosal Wave: WNL    Phase Symmetry: mild and intermittently vertical chasing wave during low pitch phonation    Periodicity: periodic    Therapeutic Probes: perceptual straining and laryngeal hyperfunction reduced with humming and increased airflow    FEES: Evaluation was performed by Tri Abreu, CCC-SLP, and Dr. Reyes. Findings demonstrate mild oropharyngeal swallow with no penetration or aspiration of liquid, purée, or solids.  " Findings are most consistent with muscle tension dysphagia.       Impressions and Plan:     Ravin is a 32-year-old male presenting today with dysphonia (R49.0) secondary to laryngeal hyperfunction (J38.7). Perceptually, his voice is mildly rough and strained with no observed vocal tremor or indication of spasming during sustained vowels, connected speech, or specific task-specific stimuli; however, Ravin reports that his voice is doing especially well today, so it may not be representative of other instances when he is demonstrating increased voice difficulties. Laryngoscopy today demonstrated excessive laryngeal tension indicated by mediolateral and anteroposterior compression during voice use and rapid, fast shaking of the laryngeal structures.  This is more indicative of a tension-based tremor rather than neurologic during today's evaluation. Therefore, additional sessions of voice therapy has been recommended to aid in the differential diagnosis process, as well as to continue to progress that he is made with the previous speech-language pathologist. Ravin plans to make recordings of himself when his voice is doing more poorly, as his voice is doing well today. Ravin is in agreement with this plan of care.        Goals:    Coordinate phonatory and respiratory subsystems, demonstrating adequate independence for activities of daily communication     Decrease extrinsic laryngeal muscle activity during communication events     Improve voice quality in all activities of daily living using, as measured by a minimum of a 50% point reduction on the Voice Handicap Index-10 or Singing VHI    Demonstrate appropriate vocal hygiene including, but not limited to, adequate hydration, avoiding vocal abuse, integrating behavioral and dietary changes for GERD into their daily life?.     Incorporate behaviors to reduce irritation and promote laryngeal healing and prevent recurrence.?     Implement behavioral strategies to reduce  laryngopharyngeal reflux. ?     Independently maintain a forward focus voice placement 90% of the time during conversational speech.    Independently perform the Vocal Function Exercises, rebalancing respiration, phonation, and resonance 90% of the time    Perform High Level Phonation and Pitch Range Navigation Exercises independently 90% of the time      Billed Procedures:    No charge facility fee    Perceptual voice assessment [id number]      Thank you for allowing me to participate in this patient's care,    Aurora Velasquez MS CCC-SLP  Speech-Language Pathologist  [facility] - Department of Otolaryngology  [facility]  [email or web address]  [phone]    *This note may have been completed using [id number] dictation software, so errors may exist. Please contact me for clarification if needed*

## 2022-12-20 NOTE — PATIENT INSTRUCTIONS
1.  You were seen in the ENT Clinic today by . If you have any questions or concerns after your appointment, please call [phone]. Press option #1 for scheduling related needs. Press option #3 for Nurse advice.    2.   has recommended  the following:   - voice therapy    3.  Plan is to return to clinic as needed after therapy      Raysa Hinojosa LPN  [phone]  [facility] Otolaryngology

## 2022-12-20 NOTE — PROGRESS NOTES
Speech-Language Pathology Department   EVALUATION  [facility]  Clinical Swallow Evaluation with Endoscopic Guidance by MD    12/20/22 1500   General Information   Type Of Visit Initial   Start Of Care Date 12/20/22   Referring Physician Dr. Regina Reyes   Orders Evaluate And Treat   Orders Comment Clinical Swallow Evaluation   Medical Diagnosis Dysphagia, unspecified   Precautions/limitations No Known Precautions/limitations   Hearing Functional in 1:1 setting   Pertinent History of Current Problem/OT: Additional Occupational Profile Info Ravin Yañez is a 32 year old male with a PMH significant for seizures, GERD, anxiety, depression, alcohol use disorder with alcoholic hepatitis and alcohol withdrawal delirium, and tobacco use disorder. He also reports dysphonia that started when he was 19 years old with no clear inciting incident, and dysphagia with onset about 1 year ago. Reports it happens about 1x/month in which the second bite of food feels like it gets stuck [facility] down his throat. He then has to forcefully swallow to get it down. States he then has to stop eating for a few minutes and things are okay. It only happens with solids.   Respiratory Status Room air   Prior Level Of Function Swallowing   Prior Level Of Function Comment Regular textures/thin liquids   Patient Role/employment History Employed  (reports working in [employer] industry)   General Observations Pt pleasant and cooperative throughout evaluation   Patient/family Goals To figure out what is going on with the tension in his throat   Clinical Swallow Evaluation   Oral Musculature generally intact   Structural Abnormalities none present   Dentition present and adequate   Mucosal Quality adequate   Mandibular Strength and Mobility intact   Oral Labial Strength and Mobility WFL   Lingual Strength and Mobility WFL   Velar Elevation intact   Buccal Strength and Mobility intact   Laryngeal  Function Cough;Swallow;Voicing initiated   Oral Musculature Comments Generally WFL   Additional Documentation Yes   Clinical Swallow Eval: Thin Liquid Texture Trial   Mode of Presentation, Thin Liquids straw;fed by clinician   Volume of Liquid or Food Presented 4oz   Oral Phase of Swallow WFL   Pharyngeal Phase of Swallow intact   Diagnostic Statement PO trials evaluated under endoscopy completed by MD. No penetration/aspiration. Minimal coating of residuals in valleculae/piriforms.   Clinical Swallow Eval: Puree Solid Texture Trial   Mode of Presentation, Puree spoon;fed by clinician   Volume of Puree Presented 3 tablespoons   Oral Phase, Puree WFL   Pharyngeal Phase, Puree intact   Diagnostic Statement PO trials evaluated under endoscopy. No penetration/aspiration. Trace vallecular residuals.   Clinical Swallow Eval: Regular (Solid)   Mode of Presentation self-fed   Volume Presented 1 Celena doone   Oral Phase WFL   Pharyngeal Phase intact   Diagnostic Statement PO trials evaluated under endoscopy completed by MD. No penetration/aspiration. Minimal vallecular residuals.   Swallow Compensations   Swallow Compensations No compensations were used   Educational Assessment   Barriers to Learning No barriers   Swallow Eval: Clinical Impressions   Skilled Criteria for Therapy Intervention No problems identified which require skilled intervention   Dysphagia Outcome Severity Scale (RODRI) Level 6 - RODRI   Treatment Diagnosis Safe, functional oropharyngeal swallow response   Diet texture recommendations Regular diet;Thin liquids (level 0)   Recommended Feeding/Eating Techniques alternate between small bites and sips of food/liquid;maintain upright posture during/after eating for 30 mins;small sips/bites   Demonstrates Need for Referral to Another Service other (see comments)  (voice SLP)   Predicted Duration of Therapy Intervention (days/wks) Evaluation only   Anticipated Discharge Disposition home   Risks and Benefits of  Treatment have been explained. Yes   Patient, family and/or staff in agreement with Plan of Care Yes   Clinical Impression Comments Pt presents today with a safe, functional oropharyngeal swallow response. Oral motor examination is unremarkable. PO trials evaluated under endoscopy completed by MD. No penetration/aspiration of thin liquid, puree or solid PO trials. Minimal coating of residuals in valleculae/piriforms with thin liquids. x1 trace puree and x1 minimal solid vallecular residuals. Pt demonstrating significant laryngeal tension and suspect intermittent, transient dysphagia is d/t muscle tension. Recommend pt continue with regular textures/thin liquids with use of general safe swallow strategies. Recommend pt participate in voice therapy services with [facility]. Should dysphagia symptoms persist after therapy, re-evaluation would be warranted.   Total Session Time   SLP Eval: oral/pharyngeal swallow function, clinical minutes (11730) 15   Total Evaluation Time 15     Thank you for the referral of Ravin Yañez. If you have any questions about this report, please contact me using the information below.     RAYMOND Luther MA (music), CCC-SLP   Speech Language Pathologist  [place] Trained Vocologist   [facility]  Dept. of Otolaryngology  Department of Rehabilitation Services  [address] [place] [id number]  Email: [email or web address]   Pronouns: she/her/hers

## 2023-02-10 ENCOUNTER — VIRTUAL VISIT (OUTPATIENT)
Dept: OTOLARYNGOLOGY | Facility: CLINIC | Age: 33
End: 2023-02-10
Payer: COMMERCIAL

## 2023-02-10 ENCOUNTER — TELEPHONE (OUTPATIENT)
Dept: OTOLARYNGOLOGY | Facility: CLINIC | Age: 33
End: 2023-02-10

## 2023-02-10 DIAGNOSIS — R49.0 DYSPHONIA: Primary | ICD-10-CM

## 2023-02-10 DIAGNOSIS — J38.7 LARYNGEAL HYPERFUNCTION: ICD-10-CM

## 2023-02-10 PROCEDURE — 92507 TX SP LANG VOICE COMM INDIV: CPT | Mod: GN | Performed by: SPEECH-LANGUAGE PATHOLOGIST

## 2023-02-10 NOTE — TELEPHONE ENCOUNTER
Spoke with patient regarding scheduling for 3 more teletherapy sessions for first available, probably in April and add to Maybe 3 sessions about 3 weeks apart. Scheduled patient accordingly and patient will see appointments in MyChart.-Per patient

## 2023-02-10 NOTE — PROGRESS NOTES
[facility] VOICE CLINIC  VOICE / UPPER AIRWAY TREATMENT NOTE (CPT 60000)      Patient's name: Ravin Yañez  Date of Session: 2/10/2023  Providing SLP: Aurora Velasquez MS CCC-SLP  Referring Provider: Regina Reyes MD  Insurance Coverage: [employer]  Total # of SLP Visits: 2  # of SLP Therapy Sessions: 1  Session Location: Ravin was seen via telehealth today.     The patient has been notified and verbally consented to the following statements:     This video visit will be conducted between you and your provider.    Patient has opted to conduct today's video visit vs an in-person appointment, and is not able to attend due to possible exposure to COVID-19.      If during the course of the call the provider feels a video visit is not appropriate, you will not be charged for this service.     Provider has received verbal consent for billable virtual visit from the patient? 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 Perceptually, his voice is mildly rough and strained with no observed vocal tremor or indication of spasming during sustained vowels, connected speech, or specific task-specific stimuli; however, Ravin reports that his voice is doing especially well today, so it may not be representative of other instances when he is demonstrating increased voice difficulties. Laryngoscopy today demonstrated excessive laryngeal tension indicated by mediolateral and anteroposterior compression during voice use and rapid, fast shaking of the laryngeal structures.  This is more indicative of a tension-based tremor rather than  neurologic during today's evaluation. Therefore, additional sessions of voice therapy has been recommended to aid in the differential diagnosis process, as well as to continue to progress that he is made with the previous speech-language pathologist. Ravin plans to make recordings of himself when his voice is doing more poorly, as his voice is doing well today. Ravin is in agreement with this plan of care.        SUBJECTIVE:  Ravin reports continued voice issues since his most recent session with little change.  He reports that 90% of the time he feels increased effort, and 10% of the time his voice is normal.  Today, he presents with a similar voice as he did during his initial evaluation with mild roughness and strain and no indication of vocal tremor or spasm.  He reports that this tension can vary, and is consistently worse in the morning and later in the day with increased use.  This will oftentimes results in his voice going higher pitch throughout the day.  He has not found eliminating caffeine or working on his mental health to be particularly helpful with his voice.  He does report that alcohol intake can completely reduce his muscle tension about 95% of the time. During our initial evaluation, we discussed the importance of recording his voice on his worst days to be able to provide this for future sessions if his voice is doing well; however, he has not been able to do this.  He has participated in voice therapy in [place] prior to seeing us and has only worked on easy onset phonation.      OBJECTIVE/ASSESSMENT:    Patient Supplied Answers To Last 2 VHI Questionnaires  Voice Handicap Index (VHI-10) 12/20/2022 2/10/2023   My voice makes it difficult for people to hear me 2 2   People have difficulty understanding me in a noisy room 2 1   My voice difficulties restrict my personal and social life.  4 3   I feel left out of conversations because of my voice 4 2   My voice problem causes me to lose income 3 2   I  "feel as though I have to strain to produce voice 4 4   The clarity of my voice is unpredictable 4 4   My voice problem upsets me 4 4   My voice makes me feel handicapped 4 4   People ask, \"What's wrong with your voice?\" 2 2   VHI-10 33 28     Patient Supplied Answers To Last 2 CSI Questionnaires  Cough Severity Index (CSI) 12/20/2022   My cough is worse when I lie down 0   My coughing problem causes me to restrict my personal and social life 0   I tend to avoid places because of my cough problem 0   I feel embarrassed because of my coughing problem 0   People ask, ''What's wrong?'' because I cough a lot 0   I run out of air when I cough 0   My coughing problem affects my voice 0   My coughing problem limits my physical activity 0   My coughing problem upsets me 0   People ask me if I am sick because I cough a lot 0   CSI Score 0     Patient Supplied Answers To Last 2 EAT Questionnaires  Eating Assessment Tool (EAT-10) 12/20/2022   My swallowing problem has caused me to lose weight 0   My swallowing problem interferes with my ability to go out for meals 0   Swallowing liquids takes extra effort 0   Swallowing solids takes extra effort 2   Swallowing pills takes extra effort 0   Swallowing is painful 0   The pleasure of eating is affected by my swallowing 2   When I swallow food sticks in my throat 0   I cough when I eat 0   Swallowing is stressful 1   EAT-10 5       THERAPEUTIC ACTIVITIES:  Semi-Occluded Vocal Tract Exercises (SOVTs) are a series of exercises aimed to recoordinate respiration, phonation, and resonance to produce an effortless, clear voice. Such exercises include straw phonation with or without water resistance, lip trills, humming/Basic Training Gesture for Resonant Voice Therapy, and transoral lip buzz/Basic Training Gesture for Vocal Function Exercises at comfortable speaking pitches and glissando tasks. These exercises were instructed today, and Ravin performed them with 70% accuracy with maximal " clinician cueing and modeling. Adjustments were made to reduce effort with straw phonation with water resistance techniques. Ravin reported that he felt some tension release but not completely.  Due to his difficulties with his pitch elevating as tension increases, descending glissando tasks were addressed to help bring his voice down into his modal pitch.  His vocal pitch today during conversational speech was judged to be within normal limits for age and gender matched peers. Ravin reported feeling clicking in his throat while humming, so he was redirected to focus on vibrations at his lips rather than hyperfixating on laryngeal sensations.       IMPRESSIONS:   Dysphonia (R49.0) secondary to laryngeal hyperfunction (J38.7)    Ravin had a productive first day of voice therapy.  SOVT techniques were addressed, and Ravin appeared to do best when he is focusing on forward focus vibrations and not as much on the sensations in his throat.  He again presented today with a fairly normal voice, and the importance of being able to hear his voice when it is doing poorly was stressed to continue to aid with differential diagnosis.  I provided Jose Miguel with my office phone number, and I would like for him to leave me a voicemail so that I can hear when he is having increased voice issues       PLAN:    Ravin will perform SOVT exercises 5 times daily for 2-3 minutes between sessions    Written instructions were provided to aid home programming via [employer]    We will plan to address circumlaryngeal massage during next session    Ravin will record his voice either on a phone melita or by leaving the a voicemail at my office number.    Ravin continues to demonstrate adequate progress toward long- and short-term goals.    Continued voice therapy services are recommended. Ravin will follow-up for additional sessions in 3 weeks. Current goals will continue to be addressed.    Ravin is in agreement with this plan of care.      SLP PLAN:  Voice SLP presence at  next appointment with laryngologist (e.g. Dr. Reyes, Dr. Aguilar, Dr. Denis) will be needed but has not been scheduled      BILLING SUMMARY:    Total treatment time: 64 minutes    Speech Pathology Treatment (18982)    No charge facility fee      Aurora Velasquez MS CCC-SLP  Speech-Language Pathologist  [facility] - Department of Otolaryngology  [facility]  [email or web address]  [phone]    *This note may have been completed using [id number] dictation software, so errors may exist. Please contact me for clarification if needed*

## 2023-02-10 NOTE — LETTER
2/10/2023       RE: Ravin Yañez  [address] [place] [address]     Dear Colleague,    Thank you for referring your patient, Ravin Yañez, to the [facility] VOICE CLINIC [place] [facility]. Please see a copy of my visit note below.    [facility] VOICE [facility]  VOICE / UPPER AIRWAY TREATMENT NOTE (CPT 09749)      Patient's name: Ravin Yañez  Date of Session: 2/10/2023  Providing SLP: Aurora Velasquez MS CCC-SLP  Referring Provider: Regina Reyes MD  Insurance Coverage: [employer]  Total # of SLP Visits: 2  # of SLP Therapy Sessions: 1  Session Location: Ravin was seen via telehealth today.     The patient has been notified and verbally consented to the following statements:     This video visit will be conducted between you and your provider.    Patient has opted to conduct today's video visit vs an in-person appointment, and is not able to attend due to possible exposure to COVID-19.      If during the course of the call the provider feels a video visit is not appropriate, you will not be charged for this service.     Provider has received verbal consent for billable virtual visit from the patient? 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 Perceptually, his voice is mildly rough and strained with no observed vocal tremor or indication of spasming during sustained vowels, connected speech, or specific task-specific stimuli; however, Ravin reports that his voice is doing especially well today, so it may not be representative of other  instances when he is demonstrating increased voice difficulties. Laryngoscopy today demonstrated excessive laryngeal tension indicated by mediolateral and anteroposterior compression during voice use and rapid, fast shaking of the laryngeal structures.  This is more indicative of a tension-based tremor rather than neurologic during today's evaluation. Therefore, additional sessions of voice therapy has been recommended to aid in the differential diagnosis process, as well as to continue to progress that he is made with the previous speech-language pathologist. Ravin plans to make recordings of himself when his voice is doing more poorly, as his voice is doing well today. Ravin is in agreement with this plan of care.        SUBJECTIVE:  Ravin reports continued voice issues since his most recent session with little change.  He reports that 90% of the time he feels increased effort, and 10% of the time his voice is normal.  Today, he presents with a similar voice as he did during his initial evaluation with mild roughness and strain and no indication of vocal tremor or spasm.  He reports that this tension can vary, and is consistently worse in the morning and later in the day with increased use.  This will oftentimes results in his voice going higher pitch throughout the day.  He has not found eliminating caffeine or working on his mental health to be particularly helpful with his voice.  He does report that alcohol intake can completely reduce his muscle tension about 95% of the time. During our initial evaluation, we discussed the importance of recording his voice on his worst days to be able to provide this for future sessions if his voice is doing well; however, he has not been able to do this.  He has participated in voice therapy in [place] prior to seeing us and has only worked on easy onset phonation.      OBJECTIVE/ASSESSMENT:    Patient Supplied Answers To Last 2 VHI Questionnaires  Voice Handicap Index (VHI-10)  "12/20/2022 2/10/2023   My voice makes it difficult for people to hear me 2 2   People have difficulty understanding me in a noisy room 2 1   My voice difficulties restrict my personal and social life.  4 3   I feel left out of conversations because of my voice 4 2   My voice problem causes me to lose income 3 2   I feel as though I have to strain to produce voice 4 4   The clarity of my voice is unpredictable 4 4   My voice problem upsets me 4 4   My voice makes me feel handicapped 4 4   People ask, \"What's wrong with your voice?\" 2 2   VHI-10 33 28     Patient Supplied Answers To Last 2 CSI Questionnaires  Cough Severity Index (CSI) 12/20/2022   My cough is worse when I lie down 0   My coughing problem causes me to restrict my personal and social life 0   I tend to avoid places because of my cough problem 0   I feel embarrassed because of my coughing problem 0   People ask, ''What's wrong?'' because I cough a lot 0   I run out of air when I cough 0   My coughing problem affects my voice 0   My coughing problem limits my physical activity 0   My coughing problem upsets me 0   People ask me if I am sick because I cough a lot 0   CSI Score 0     Patient Supplied Answers To Last 2 EAT Questionnaires  Eating Assessment Tool (EAT-10) 12/20/2022   My swallowing problem has caused me to lose weight 0   My swallowing problem interferes with my ability to go out for meals 0   Swallowing liquids takes extra effort 0   Swallowing solids takes extra effort 2   Swallowing pills takes extra effort 0   Swallowing is painful 0   The pleasure of eating is affected by my swallowing 2   When I swallow food sticks in my throat 0   I cough when I eat 0   Swallowing is stressful 1   EAT-10 5       THERAPEUTIC ACTIVITIES:  Semi-Occluded Vocal Tract Exercises (SOVTs) are a series of exercises aimed to recoordinate respiration, phonation, and resonance to produce an effortless, clear voice. Such exercises include straw phonation with or " without water resistance, lip trills, humming/Basic Training Gesture for Resonant Voice Therapy, and transoral lip buzz/Basic Training Gesture for Vocal Function Exercises at comfortable speaking pitches and glissando tasks. These exercises were instructed today, and Ravin performed them with 70% accuracy with maximal clinician cueing and modeling. Adjustments were made to reduce effort with straw phonation with water resistance techniques. Ravin reported that he felt some tension release but not completely.  Due to his difficulties with his pitch elevating as tension increases, descending glissando tasks were addressed to help bring his voice down into his modal pitch.  His vocal pitch today during conversational speech was judged to be within normal limits for age and gender matched peers. Ravin reported feeling clicking in his throat while humming, so he was redirected to focus on vibrations at his lips rather than hyperfixating on laryngeal sensations.       IMPRESSIONS:   Dysphonia (R49.0) secondary to laryngeal hyperfunction (J38.7)    Ravin had a productive first day of voice therapy.  SOVT techniques were addressed, and Ravin appeared to do best when he is focusing on forward focus vibrations and not as much on the sensations in his throat.  He again presented today with a fairly normal voice, and the importance of being able to hear his voice when it is doing poorly was stressed to continue to aid with differential diagnosis.  I provided Jose Miguel with my office phone number, and I would like for him to leave me a voicemail so that I can hear when he is having increased voice issues       PLAN:    Ravin will perform SOVT exercises 5 times daily for 2-3 minutes between sessions    Written instructions were provided to aid home programming via [employer]    We will plan to address circumlaryngeal massage during next session    Ravin will record his voice either on a phone melita or by leaving the a voicemail at my office  number.    Ravin continues to demonstrate adequate progress toward long- and short-term goals.    Continued voice therapy services are recommended. Ravin will follow-up for additional sessions in 3 weeks. Current goals will continue to be addressed.    Ravin is in agreement with this plan of care.      SLP PLAN:  Voice SLP presence at next appointment with laryngologist (e.g. Dr. Reyes, Dr. Aguilar, Dr. Denis) will be needed but has not been scheduled      BILLING SUMMARY:    Total treatment time: 64 minutes    Speech Pathology Treatment (35558)    No charge facility fee      Aurora Velasquez MS CCC-SLP  Speech-Language Pathologist  [facility] Voice [facility] - Department of Otolaryngology  [facility]  [email or web address]  [phone]    *This note may have been completed using myghst-vp-[id number] dictation software, so errors may exist. Please contact me for clarification if needed*      Again, thank you for allowing me to participate in the care of your patient.      Sincerely,    Aurora Velasquez, PAUL

## 2023-02-10 NOTE — PATIENT INSTRUCTIONS
"Semi-Occluded Vocal Tract Exercises         Sustain for 5-7 seconds each, total of 2-3 minutes, 5-6  mini  practice sessions per day    (You can do these as often as 1 minute per hour)         Straw Phonation with Water Resistance / Cup Bubbles  (1 inch of water)  Airflow  Goal: consistent, small bubbles  Voice   Air stays on, add voice using  No   with round lips and forward placement  Goal: consistent, small bubbles  Pitch glides  Goal: No increase of bubbles when going up or down. Remember the  buzzy sound  - keep the voice forward  Humming / Resonant Voice Therapy  Hummmm: feel vibration on the lips without throat straining  [id number]: keep the vibrations forward and use the \"m\" as check-ins      "

## 2023-02-12 ENCOUNTER — HEALTH MAINTENANCE LETTER (OUTPATIENT)
Age: 33
End: 2023-02-12

## 2023-03-01 ENCOUNTER — VIRTUAL VISIT (OUTPATIENT)
Dept: OTOLARYNGOLOGY | Facility: CLINIC | Age: 33
End: 2023-03-01
Payer: COMMERCIAL

## 2023-03-01 DIAGNOSIS — R49.0 DYSPHONIA: Primary | ICD-10-CM

## 2023-03-01 DIAGNOSIS — J38.7 LARYNGEAL HYPERFUNCTION: ICD-10-CM

## 2023-03-01 PROCEDURE — 92507 TX SP LANG VOICE COMM INDIV: CPT | Mod: GN | Performed by: SPEECH-LANGUAGE PATHOLOGIST

## 2023-03-01 NOTE — PATIENT INSTRUCTIONS
Semi-Occluded Vocal Tract Exercises         Sustain for 5-7 seconds each, total of 2-3 minutes, 5-6  mini  practice sessions per day    (You can do these as often as 1 minute per hour)         Straw Phonation with Water Resistance / Cup Bubbles  (1 inch of water)  Voice   Air stays on, add voice using  No   with round lips and forward placement  Goal: consistent, small bubbles        Resonant Voice Therapy Exercises    Targets:   Feel vibration in mid face or in mouth (lips, teeth, cheek bones, nose, etc.) - Maximum the vibration   Make it easy - Minimize vocal effort       Spend as much time as you need on each of the following levels to have a consistently clear voice - when it is consistently clear and across variety in that step move on to the next level. You don t necessarily have to spend much time, spend as much or as little time as you need for each level. Except for Step 6 (reading aloud), spend 2-5 minutes reading out loud maintaining the clear voice.         Do all exercises twice daily, spending 5-10 minutes total on average across the exercises.  Sound level humming ( m  sound) or holding  n,   ng,   z,   v,   oh  or  oo  with lip buzz, etc, at your normal speaking pitch and normal speaking loudness.  Syllable repetition level -  maggie maggie maggie   momomomo   moomoomoo   mamamama , etc. or  n  + vowels, etc.  Word repetition level - money money money, Monday Monday Monday, many, many, many, many (see list of /m/ words/phrases and pick a few words)  Phrase level - Many men on the moon. Mail my money. One Monday morning, etc. (see list of /m/ words/phrases and pick a few phrases). Start with chanting and gradually add more natural intonation.  Automatic speech (over learned/rote) - counting, days of week, months of the year, ABCs, nursery rhymes, lyrics to songs you know well, etc. Start with chanting if needed as you did with exercise 4.  Read out loud - any material will do: newspaper, magazine, book, junk  mail, etc. Try to spend the majority of the practice time with reading but ensure you re maintaining the improved/strong voice throughout.       Throughout the day:  Notice your voice as you re saying yes/no responses (right, all right, sure, okay, yes, no, etc.) and pull-out/fix the voice if needed using the same yes/no responses as spring boards to improve the voice. If needed, sip water, yawn, shift how you re standing/sitting, stretch, etc.        Additional /m/-loaded sentences:  Many miles  Mail my money  Pau  Endy.  Mean Marge made me mad.  Jenny marinates meat.  Maybe Pau measured.  Shant makes much money.  My mom made marmalade  Many munchkins made merry.  You mongrels may maul mice.  Meager meals made my mom moan.  More mayhem made managers mad.  Move my mom s many magazines.  Major migraines made me miserable.  My meddling may mean major misery.  Madam Tapia mangled magnolias.  Mercy me!  Meet my mother.  Florian made moccasins.  Avelino Rivera met Max.  My mother makes muffins.  Mary Alice marched many miles.  My mother mailed me merchandise.  Make more tatum marmalade.  My mother makes much money.  Monday morning must mean mishaps.  Stepan memorized measurements.  Mosquitoes may mean malaria.  Mend Rebeca s many mismatched mittens.  Mass media mocks many matters.  Modest mannerisms make me marvel.  Molten magma might migrate many miles.  Misconduct may mean many misdemeanors.

## 2023-03-01 NOTE — PROGRESS NOTES
[facility] VOICE CLINIC  VOICE / UPPER AIRWAY TREATMENT NOTE (CPT [id number])      Patient's name: Ravin Yañez  Date of Session: 3/1/2023  Providing SLP: Aurora Velasquez MS CCC-SLP  Referring Provider: Regina Reyes MD  Insurance Coverage: [employer]  Total # of SLP Visits: 3  # of SLP Therapy Sessions: 2  Session Location: Ravin was seen via telehealth today.     The patient has been notified and verbally consented to the following statements:     This video visit will be conducted between you and your provider.    Patient has opted to conduct today's video visit vs an in-person appointment, and is not able to attend due to possible exposure to COVID-19.     If during the course of the call the provider feels a video visit is not appropriate, you will not be charged for this service.     Provider has received verbal consent for billable virtual visit from the patient? 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 Perceptually, his voice is mildly rough and strained with no observed vocal tremor or indication of spasming during sustained vowels, connected speech, or specific task-specific stimuli; however, Ravin reports that his voice is doing especially well today, so it may not be representative of other instances when he is demonstrating increased voice difficulties. Laryngoscopy today demonstrated excessive laryngeal tension indicated by mediolateral and anteroposterior compression during voice use and rapid, fast shaking of the laryngeal structures.  This is more indicative of a tension-based tremor rather than neurologic  "during today's evaluation. Therefore, additional sessions of voice therapy has been recommended to aid in the differential diagnosis process, as well as to continue to progress that he is made with the previous speech-language pathologist. Ravin plans to make recordings of himself when his voice is doing more poorly, as his voice is doing well today. Ravin is in agreement with this plan of care.        SUBJECTIVE:  Ravin reports that his voice has been doing well over the past month; however, he notes that his voice can be doing well or poorly for several months at a time. He feels that his voice issues and throat discomfort are much more manageable at this time (e.g. pain decreased from 8/10 at times (10 being worst) to 2/10 during today's session). He was very diligent with performing voice therapy techniques for the first few days after our last session and felt that this was helpful. He reports past instances where his throat has felt so tight that he might have breathing or swallowing issues but has never ended up experiencing these issues.      OBJECTIVE/ASSESSMENT:    Patient Supplied Answers To Last 2 VHI Questionnaires  Voice Handicap Index (VHI-10) 2/10/2023 3/1/2023   My voice makes it difficult for people to hear me 2 2   People have difficulty understanding me in a noisy room 1 0   My voice difficulties restrict my personal and social life.  3 2   I feel left out of conversations because of my voice 2 2   My voice problem causes me to lose income 2 2   I feel as though I have to strain to produce voice 4 2   The clarity of my voice is unpredictable 4 3   My voice problem upsets me 4 2   My voice makes me feel handicapped 4 2   People ask, \"What's wrong with your voice?\" 2 2   VHI-10 28 19     Patient Supplied Answers To Last 2 CSI Questionnaires  Cough Severity Index (CSI) 12/20/2022   My cough is worse when I lie down 0   My coughing problem causes me to restrict my personal and social life 0   I tend to " avoid places because of my cough problem 0   I feel embarrassed because of my coughing problem 0   People ask, ''What's wrong?'' because I cough a lot 0   I run out of air when I cough 0   My coughing problem affects my voice 0   My coughing problem limits my physical activity 0   My coughing problem upsets me 0   People ask me if I am sick because I cough a lot 0   CSI Score 0     Patient Supplied Answers To Last 2 EAT Questionnaires  Eating Assessment Tool (EAT-10) 12/20/2022   My swallowing problem has caused me to lose weight 0   My swallowing problem interferes with my ability to go out for meals 0   Swallowing liquids takes extra effort 0   Swallowing solids takes extra effort 2   Swallowing pills takes extra effort 0   Swallowing is painful 0   The pleasure of eating is affected by my swallowing 2   When I swallow food sticks in my throat 0   I cough when I eat 0   Swallowing is stressful 1   EAT-10 5       THERAPEUTIC ACTIVITIES:  Semi-Occluded Vocal Tract Exercises (SOVTs) are a series of exercises aimed to recoordinate respiration, phonation, and resonance to produce an effortless, clear voice. Such exercises include straw phonation with or without water resistance, lip trills, humming/Basic Training Gesture for Resonant Voice Therapy, and transoral lip buzz/Basic Training Gesture for Vocal Function Exercises at comfortable speaking pitches and glissando tasks. These exercises were reviewed today, and Ravin was highly successful with independent implementation of straw phonation with water resistance today, so our focus shifted to Resonant Voice Therapy techniques.    Resonant Voice Therapy (RVT) involves training voice-disordered individuals to produce voice in an easier, more resonant and forward-focused manner throughout the speech hierarchy. The objective of this approach is to achieve the strongest possible voice with the least effort and impact stress between the vocal folds to minimize the likelihood  of injury. These exercises were instructed today from the phoneme to automatic speech level, and Ravin performed them with 65% accuracy with maximal clinician cueing and modeling. Adjustments were made to maintain forward-focused resonance throughout the speech hierarchy to place less pressure/emphasis on his larynx. After performing these techniques, he reported that his voice felt lower, which is a sign of a more relaxed voice quality as his voice tends to shift to higher pitches when it is more tense.    Cough and throat clearing reduction strategies are a behavioral treatment to replace chronic coughing/throat clearing behaviors with multiple effortful swallows, coughing and throat clearing without phonation, and rescue breathing, thus reducing laryngeal hypersensitivity. Ravin reports that he will frequently throat clear, particularly with talking, and harsh throat clearing was observed. He was instructed to replace this behavior with taking a drink of water, which he had nearby today.      IMPRESSIONS:   Dysphonia (R49.0) secondary to laryngeal hyperfunction (J38.7)    Ravin had a productive session of voice therapy, noting voice improvements with diligent implementation of SOVT techniques between sessions. Resonant Voice Therapy techniques were instructed up the speech hierarchy today, and Ravin demonstrated high accuracy and a lower and more relaxed voice quality afterwards, reporting that he was feeling encouraged by this progress.       PLAN:    Ravin will perform RVT techniques once or twice daily for a total of 10-15 minutes per day    Written instructions were provided to aid home programming via [employer]    We will plan to address circumlaryngeal massage during next session    Ravin will record his voice either on a phone melita or by leaving the a voicemail at my office number if he is experiencing a particularly bad voice day.    Ravin continues to demonstrate adequate progress toward long- and short-term  goals.    Continued voice therapy services are recommended. Ravin will follow-up for additional sessions in 3 weeks. Current goals will continue to be addressed.    Ravin is in agreement with this plan of care.      SLP PLAN:  Voice SLP presence at next appointment with laryngologist (e.g. Dr. Reyes, Dr. Aguilar, Dr. Denis) will be needed but has not been scheduled      BILLING SUMMARY:    Total treatment time: 46 minutes    Speech Pathology Treatment (29045)    No charge facility fee      Aurora Velasquez MS CCC-SLP  Speech-Language Pathologist  [facility] - Department of Otolaryngology  [facility]  [email or web address]  [phone]    *This note may have been completed using [id number] dictation software, so errors may exist. Please contact me for clarification if needed*

## 2023-03-01 NOTE — LETTER
3/1/2023       RE: Ravin Yañez  [address] [place] [address]       Dear Colleague,    Thank you for referring your patient, Ravin Yañez, to the [facility] VOICE [facility]INIC [place] [facility]. Please see a copy of my visit note below.    [facility] VOICE [facility]  VOICE / UPPER AIRWAY TREATMENT NOTE (CPT [id number])      Patient's name: Ravin Yañez  Date of Session: 3/1/2023  Providing SLP: Aurora Velasquez MS CCC-SLP  Referring Provider: Regina Reyes MD  Insurance Coverage: [employer]  Total # of SLP Visits: 3  # of SLP Therapy Sessions: 2  Session Location: Ravin was seen via telehealth today.     The patient has been notified and verbally consented to the following statements:     This video visit will be conducted between you and your provider.    Patient has opted to conduct today's video visit vs an in-person appointment, and is not able to attend due to possible exposure to COVID-19.     If during the course of the call the provider feels a video visit is not appropriate, you will not be charged for this service.     Provider has received verbal consent for billable virtual visit from the patient? 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 Perceptually, his voice is mildly rough and strained with no observed vocal tremor or indication of spasming during sustained vowels, connected speech, or specific task-specific stimuli; however, Ravin reports that his voice is doing especially well today, so it may not be representative of other  instances when he is demonstrating increased voice difficulties. Laryngoscopy today demonstrated excessive laryngeal tension indicated by mediolateral and anteroposterior compression during voice use and rapid, fast shaking of the laryngeal structures.  This is more indicative of a tension-based tremor rather than neurologic during today's evaluation. Therefore, additional sessions of voice therapy has been recommended to aid in the differential diagnosis process, as well as to continue to progress that he is made with the previous speech-language pathologist. Ravin plans to make recordings of himself when his voice is doing more poorly, as his voice is doing well today. Ravin is in agreement with this plan of care.        SUBJECTIVE:  Ravin reports that his voice has been doing well over the past month; however, he notes that his voice can be doing well or poorly for several months at a time. He feels that his voice issues and throat discomfort are much more manageable at this time (e.g. pain decreased from 8/10 at times (10 being worst) to 2/10 during today's session). He was very diligent with performing voice therapy techniques for the first few days after our last session and felt that this was helpful. He reports past instances where his throat has felt so tight that he might have breathing or swallowing issues but has never ended up experiencing these issues.      OBJECTIVE/ASSESSMENT:    Patient Supplied Answers To Last 2 VHI Questionnaires  Voice Handicap Index (VHI-10) 2/10/2023 3/1/2023   My voice makes it difficult for people to hear me 2 2   People have difficulty understanding me in a noisy room 1 0   My voice difficulties restrict my personal and social life.  3 2   I feel left out of conversations because of my voice 2 2   My voice problem causes me to lose income 2 2   I feel as though I have to strain to produce voice 4 2   The clarity of my voice is unpredictable 4 3   My voice problem upsets me 4 2  "  My voice makes me feel handicapped 4 2   People ask, \"What's wrong with your voice?\" 2 2   VHI-10 28 19     Patient Supplied Answers To Last 2 CSI Questionnaires  Cough Severity Index (CSI) 12/20/2022   My cough is worse when I lie down 0   My coughing problem causes me to restrict my personal and social life 0   I tend to avoid places because of my cough problem 0   I feel embarrassed because of my coughing problem 0   People ask, ''What's wrong?'' because I cough a lot 0   I run out of air when I cough 0   My coughing problem affects my voice 0   My coughing problem limits my physical activity 0   My coughing problem upsets me 0   People ask me if I am sick because I cough a lot 0   CSI Score 0     Patient Supplied Answers To Last 2 EAT Questionnaires  Eating Assessment Tool (EAT-10) 12/20/2022   My swallowing problem has caused me to lose weight 0   My swallowing problem interferes with my ability to go out for meals 0   Swallowing liquids takes extra effort 0   Swallowing solids takes extra effort 2   Swallowing pills takes extra effort 0   Swallowing is painful 0   The pleasure of eating is affected by my swallowing 2   When I swallow food sticks in my throat 0   I cough when I eat 0   Swallowing is stressful 1   EAT-10 5       THERAPEUTIC ACTIVITIES:  Semi-Occluded Vocal Tract Exercises (SOVTs) are a series of exercises aimed to recoordinate respiration, phonation, and resonance to produce an effortless, clear voice. Such exercises include straw phonation with or without water resistance, lip trills, humming/Basic Training Gesture for Resonant Voice Therapy, and transoral lip buzz/Basic Training Gesture for Vocal Function Exercises at comfortable speaking pitches and glissando tasks. These exercises were reviewed today, and Ravin was highly successful with independent implementation of straw phonation with water resistance today, so our focus shifted to Resonant Voice Therapy techniques.    Resonant Voice " Therapy (RVT) involves training voice-disordered individuals to produce voice in an easier, more resonant and forward-focused manner throughout the speech hierarchy. The objective of this approach is to achieve the strongest possible voice with the least effort and impact stress between the vocal folds to minimize the likelihood of injury. These exercises were instructed today from the phoneme to automatic speech level, and Ravin performed them with 65% accuracy with maximal clinician cueing and modeling. Adjustments were made to maintain forward-focused resonance throughout the speech hierarchy to place less pressure/emphasis on his larynx. After performing these techniques, he reported that his voice felt lower, which is a sign of a more relaxed voice quality as his voice tends to shift to higher pitches when it is more tense.    Cough and throat clearing reduction strategies are a behavioral treatment to replace chronic coughing/throat clearing behaviors with multiple effortful swallows, coughing and throat clearing without phonation, and rescue breathing, thus reducing laryngeal hypersensitivity. Ravin reports that he will frequently throat clear, particularly with talking, and harsh throat clearing was observed. He was instructed to replace this behavior with taking a drink of water, which he had nearby today.      IMPRESSIONS:   Dysphonia (R49.0) secondary to laryngeal hyperfunction (J38.7)    Ravin had a productive session of voice therapy, noting voice improvements with diligent implementation of SOVT techniques between sessions. Resonant Voice Therapy techniques were instructed up the speech hierarchy today, and Ravin demonstrated high accuracy and a lower and more relaxed voice quality afterwards, reporting that he was feeling encouraged by this progress.       PLAN:    Ravin will perform RVT techniques once or twice daily for a total of 10-15 minutes per day    Written instructions were provided to aid home  programming via [employer]    We will plan to address circumlaryngeal massage during next session    Ravin will record his voice either on a phone melita or by leaving the a voicemail at my office number if he is experiencing a particularly bad voice day.    Ravin continues to demonstrate adequate progress toward long- and short-term goals.    Continued voice therapy services are recommended. Ravin will follow-up for additional sessions in 3 weeks. Current goals will continue to be addressed.    Ravin is in agreement with this plan of care.      SLP PLAN:  Voice SLP presence at next appointment with laryngologist (e.g. Dr. Reyes, Dr. Aguilar, Dr. Denis) will be needed but has not been scheduled      BILLING SUMMARY:    Total treatment time: 46 minutes    Speech Pathology Treatment (31352)    No charge facility fee      *This note may have been completed using [id number] dictation software, so errors may exist. Please contact me for clarification if needed*      Again, thank you for allowing me to participate in the care of your patient.      Sincerely,    Aurora Velasquez, SLP

## 2023-03-23 ENCOUNTER — VIRTUAL VISIT (OUTPATIENT)
Dept: OTOLARYNGOLOGY | Facility: CLINIC | Age: 33
End: 2023-03-23
Payer: COMMERCIAL

## 2023-03-23 DIAGNOSIS — J38.7 LARYNGEAL HYPERFUNCTION: ICD-10-CM

## 2023-03-23 DIAGNOSIS — R49.0 DYSPHONIA: Primary | ICD-10-CM

## 2023-03-23 PROCEDURE — 92507 TX SP LANG VOICE COMM INDIV: CPT | Mod: GN | Performed by: SPEECH-LANGUAGE PATHOLOGIST

## 2023-03-23 NOTE — LETTER
3/23/2023       RE: Ravin Yañez  [address] [place] [address]     Dear Colleague,    Thank you for referring your patient, Ravin Yañez, to the [facility] VOICE CLINIC [place] [facility]. Please see a copy of my visit note below.    [facility] VOICE [facility]  VOICE / UPPER AIRWAY TREATMENT NOTE (CPT 97940)      Patient's name: Ravin Yañez  Date of Session: 3/23/2023  Providing SLP: Aurora Velasquez MS CCC-SLP  Referring Provider: Regina Reyes MD  Insurance Coverage: [employer]  Total # of SLP Visits: 4  # of SLP Therapy Sessions: 3  Session Location: Ravin was seen via telehealth today.     The patient has been notified and verbally consented to the following statements:     This video visit will be conducted between you and your provider.    Patient has opted to conduct today's video visit vs an in-person appointment, and is not able to attend due to possible exposure to COVID-19.     If during the course of the call the provider feels a video visit is not appropriate, you will not be charged for this service.     Provider has received verbal consent for billable virtual visit from the patient? 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 Perceptually, his voice is mildly rough and strained with no observed vocal tremor or indication of spasming during sustained vowels, connected speech, or specific task-specific stimuli; however, Ravin reports that his voice is doing especially well today, so it may not be representative of other  instances when he is demonstrating increased voice difficulties. Laryngoscopy today demonstrated excessive laryngeal tension indicated by mediolateral and anteroposterior compression during voice use and rapid, fast shaking of the laryngeal structures.  This is more indicative of a tension-based tremor rather than neurologic during today's evaluation. Therefore, additional sessions of voice therapy has been recommended to aid in the differential diagnosis process, as well as to continue to progress that he is made with the previous speech-language pathologist. Ravin plans to make recordings of himself when his voice is doing more poorly, as his voice is doing well today. Ravin is in agreement with this plan of care.        SUBJECTIVE:  Ravin reports that his voice has been doing very well since his most recent session.  He has only had a couple of instances where he has experienced mild throat tightness, and he has been able to mitigate this within a couple of seconds during conversational speech by focusing on bringing his voice and vibrations up to his lips.  This does require some cognitive effort; however, he is able to make this adjustment quickly and consistently with success.  He also will perform straw phonation with water resistance some mornings if he is noticing increased voice difficulty right away in the morning.  Additionally, he feels like his voice is sounding lower in pitch, which is the opposite of when his voice is especially tense and high pitch      OBJECTIVE/ASSESSMENT:    Patient Supplied Answers To Last 2 VHI Questionnaires  Voice Handicap Index (VHI-10) 3/1/2023 3/23/2023   My voice makes it difficult for people to hear me 2 2   People have difficulty understanding me in a noisy room 0 0   My voice difficulties restrict my personal and social life.  2 2   I feel left out of conversations because of my voice 2 2   My voice problem causes me to lose income 2 2   I feel as though I have to strain to  "produce voice 2 2   The clarity of my voice is unpredictable 3 2   My voice problem upsets me 2 2   My voice makes me feel handicapped 2 2   People ask, \"What's wrong with your voice?\" 2 2   VHI-10 19 18     Patient Supplied Answers To Last 2 CSI Questionnaires  Cough Severity Index (CSI) 12/20/2022   My cough is worse when I lie down 0   My coughing problem causes me to restrict my personal and social life 0   I tend to avoid places because of my cough problem 0   I feel embarrassed because of my coughing problem 0   People ask, ''What's wrong?'' because I cough a lot 0   I run out of air when I cough 0   My coughing problem affects my voice 0   My coughing problem limits my physical activity 0   My coughing problem upsets me 0   People ask me if I am sick because I cough a lot 0   CSI Score 0     Patient Supplied Answers To Last 2 EAT Questionnaires  Eating Assessment Tool (EAT-10) 12/20/2022   My swallowing problem has caused me to lose weight 0   My swallowing problem interferes with my ability to go out for meals 0   Swallowing liquids takes extra effort 0   Swallowing solids takes extra effort 2   Swallowing pills takes extra effort 0   Swallowing is painful 0   The pleasure of eating is affected by my swallowing 2   When I swallow food sticks in my throat 0   I cough when I eat 0   Swallowing is stressful 1   EAT-10 5       THERAPEUTIC ACTIVITIES:  Resonant Voice Therapy (RVT) involves training voice-disordered individuals to produce voice in an easier, more resonant and forward-focused manner throughout the speech hierarchy. The objective of this approach is to achieve the strongest possible voice with the least effort and impact stress between the vocal folds to minimize the likelihood of injury. These exercises were instructed today from the phoneme to conversation level, and Ravin performed them with 70% accuracy with minimal clinician cueing and modeling. Ravin reports that he minimally thought about his " voice during today's conversation and denied throat tension.      IMPRESSIONS:   Dysphonia (R49.0) secondary to laryngeal hyperfunction (J38.7)    Ravin has been experiencing great success with his voice lately.  He is able to make self adjustments during conversational speech independently to mitigate throat tension within a few sentences of focusing on forward vibrations.  He was able to demonstrate this during therapeutic activities today at the conversational level       PLAN:    Ravin will perform RVT techniques once or twice daily for a total of 10-15 minutes per day    Written instructions were provided to aid home programming via [employer]    We will plan to address circumlaryngeal massage during next session    Ravin will record his voice either on a phone melita or by leaving the a voicemail at my office number if he is experiencing a particularly bad voice day.    Ravin continues to demonstrate adequate progress toward long- and short-term goals.    Continued voice therapy services are recommended. Ravin will follow-up for additional sessions in 4 weeks. Current goals will continue to be addressed.    Ravin is in agreement with this plan of care.      SLP PLAN:  Voice SLP presence at next appointment with laryngologist (e.g. Dr. Reyes, Dr. Aguilar, Dr. Denis) will be needed but has not been scheduled      BILLING SUMMARY:    Total treatment time: 49 minutes    Speech Pathology Treatment (18479)      Aurora Velasquez MS CCC-SLP  Speech-Language Pathologist  [facility] - Department of Otolaryngology  [facility]  [email or web address]  [phone]    *This note may have been completed using [id number] dictation software, so errors may exist. Please contact me for clarification if needed*      Again, thank you for allowing me to participate in the care of your patient.      Sincerely,    Aurora Velasquez, SLP

## 2023-03-23 NOTE — PROGRESS NOTES
[facility] VOICE CLINIC  VOICE / UPPER AIRWAY TREATMENT NOTE (CPT 26708)      Patient's name: Ravin Yañez  Date of Session: 3/23/2023  Providing SLP: Aurora Velasquez MS CCC-SLP  Referring Provider: Regina Reyes MD  Insurance Coverage: [employer]  Total # of SLP Visits: 4  # of SLP Therapy Sessions: 3  Session Location: Ravin was seen via telehealth today.     The patient has been notified and verbally consented to the following statements:     This video visit will be conducted between you and your provider.    Patient has opted to conduct today's video visit vs an in-person appointment, and is not able to attend due to possible exposure to COVID-19.     If during the course of the call the provider feels a video visit is not appropriate, you will not be charged for this service.     Provider has received verbal consent for billable virtual visit from the patient? 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 Perceptually, his voice is mildly rough and strained with no observed vocal tremor or indication of spasming during sustained vowels, connected speech, or specific task-specific stimuli; however, Ravin reports that his voice is doing especially well today, so it may not be representative of other instances when he is demonstrating increased voice difficulties. Laryngoscopy today demonstrated excessive laryngeal tension indicated by mediolateral and anteroposterior compression during voice use and rapid, fast shaking of the laryngeal structures.  This is more indicative of a tension-based tremor rather than neurologic  "during today's evaluation. Therefore, additional sessions of voice therapy has been recommended to aid in the differential diagnosis process, as well as to continue to progress that he is made with the previous speech-language pathologist. Ravin plans to make recordings of himself when his voice is doing more poorly, as his voice is doing well today. Ravin is in agreement with this plan of care.        SUBJECTIVE:  Ravin reports that his voice has been doing very well since his most recent session.  He has only had a couple of instances where he has experienced mild throat tightness, and he has been able to mitigate this within a couple of seconds during conversational speech by focusing on bringing his voice and vibrations up to his lips.  This does require some cognitive effort; however, he is able to make this adjustment quickly and consistently with success.  He also will perform straw phonation with water resistance some mornings if he is noticing increased voice difficulty right away in the morning.  Additionally, he feels like his voice is sounding lower in pitch, which is the opposite of when his voice is especially tense and high pitch      OBJECTIVE/ASSESSMENT:    Patient Supplied Answers To Last 2 VHI Questionnaires  Voice Handicap Index (VHI-10) 3/1/2023 3/23/2023   My voice makes it difficult for people to hear me 2 2   People have difficulty understanding me in a noisy room 0 0   My voice difficulties restrict my personal and social life.  2 2   I feel left out of conversations because of my voice 2 2   My voice problem causes me to lose income 2 2   I feel as though I have to strain to produce voice 2 2   The clarity of my voice is unpredictable 3 2   My voice problem upsets me 2 2   My voice makes me feel handicapped 2 2   People ask, \"What's wrong with your voice?\" 2 2   VHI-10 19 18     Patient Supplied Answers To Last 2 CSI Questionnaires  Cough Severity Index (CSI) 12/20/2022   My cough is worse when I " lie down 0   My coughing problem causes me to restrict my personal and social life 0   I tend to avoid places because of my cough problem 0   I feel embarrassed because of my coughing problem 0   People ask, ''What's wrong?'' because I cough a lot 0   I run out of air when I cough 0   My coughing problem affects my voice 0   My coughing problem limits my physical activity 0   My coughing problem upsets me 0   People ask me if I am sick because I cough a lot 0   CSI Score 0     Patient Supplied Answers To Last 2 EAT Questionnaires  Eating Assessment Tool (EAT-10) 12/20/2022   My swallowing problem has caused me to lose weight 0   My swallowing problem interferes with my ability to go out for meals 0   Swallowing liquids takes extra effort 0   Swallowing solids takes extra effort 2   Swallowing pills takes extra effort 0   Swallowing is painful 0   The pleasure of eating is affected by my swallowing 2   When I swallow food sticks in my throat 0   I cough when I eat 0   Swallowing is stressful 1   EAT-10 5       THERAPEUTIC ACTIVITIES:  Resonant Voice Therapy (RVT) involves training voice-disordered individuals to produce voice in an easier, more resonant and forward-focused manner throughout the speech hierarchy. The objective of this approach is to achieve the strongest possible voice with the least effort and impact stress between the vocal folds to minimize the likelihood of injury. These exercises were instructed today from the phoneme to conversation level, and Ravin performed them with 70% accuracy with minimal clinician cueing and modeling. Ravin reports that he minimally thought about his voice during today's conversation and denied throat tension.      IMPRESSIONS:   Dysphonia (R49.0) secondary to laryngeal hyperfunction (J38.7)    Ravin has been experiencing great success with his voice lately.  He is able to make self adjustments during conversational speech independently to mitigate throat tension within a few  sentences of focusing on forward vibrations.  He was able to demonstrate this during therapeutic activities today at the conversational level       PLAN:    Ravin will perform RVT techniques once or twice daily for a total of 10-15 minutes per day    Written instructions were provided to aid home programming via [employer]    We will plan to address circumlaryngeal massage during next session    Ravin will record his voice either on a phone melita or by leaving the a voicemail at my office number if he is experiencing a particularly bad voice day.    Ravin continues to demonstrate adequate progress toward long- and short-term goals.    Continued voice therapy services are recommended. Ravin will follow-up for additional sessions in 4 weeks. Current goals will continue to be addressed.    Ravin is in agreement with this plan of care.      SLP PLAN:  Voice SLP presence at next appointment with laryngologist (e.g. Dr. Reyes, Dr. Aguilar, Dr. Denis) will be needed but has not been scheduled      BILLING SUMMARY:    Total treatment time: 49 minutes    Speech Pathology Treatment (25039)      Aurora Velasquez MS CCC-SLP  Speech-Language Pathologist  [facility] Voice Clinic - Department of Otolaryngology  [facility]  [email or web address]  [phone]    *This note may have been completed using [id number]-[id number] dictation software, so errors may exist. Please contact me for clarification if needed*

## 2023-04-04 ENCOUNTER — TELEPHONE (OUTPATIENT)
Dept: OTOLARYNGOLOGY | Facility: CLINIC | Age: 33
End: 2023-04-04
Payer: COMMERCIAL

## 2023-05-15 NOTE — PROGRESS NOTES
[facility] VOICE CLINIC  VOICE / UPPER AIRWAY TREATMENT NOTE (CPT 36596)      Patient's name: Ravin Yañez  Date of Session: 5/16/2023  Providing SLP: Aurora Velasquez MS CCC-SLP  Referring Provider: Regina Reyes MD  Insurance Coverage: [employer]  Total # of SLP Visits: 5  # of SLP Therapy Sessions: 4  Session Location: Ravin was seen via telehealth today.     The patient has been notified and verbally consented to the following statements:     This video visit will be conducted between you and your provider.    Patient has opted to conduct today's video visit vs an in-person appointment, and is not able to attend due to possible exposure to COVID-19.     If during the course of the call the provider feels a video visit is not appropriate, you will not be charged for this service.     Provider has received verbal consent for billable virtual visit from the patient? 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 Perceptually, his voice is mildly rough and strained with no observed vocal tremor or indication of spasming during sustained vowels, connected speech, or specific task-specific stimuli; however, Ravin reports that his voice is doing especially well today, so it may not be representative of other instances when he is demonstrating increased voice difficulties. Laryngoscopy today demonstrated excessive laryngeal tension indicated by mediolateral and anteroposterior compression during voice use and rapid, fast shaking of the laryngeal structures.  This is more indicative of a tension-based tremor rather than neurologic  "during today's evaluation. Therefore, additional sessions of voice therapy has been recommended to aid in the differential diagnosis process, as well as to continue to progress that he is made with the previous speech-language pathologist. Ravin plans to make recordings of himself when his voice is doing more poorly, as his voice is doing well today. Ravin is in agreement with this plan of care.        SUBJECTIVE:  Ravin reports increased voice issues over the past couple of weeks characterized by throat tension, effort tremor, and difficulty with onset of phonation.  He denies an inciting factor, noting that he woke up one morning with voice issues.  He endorses that he has been inconsistent with his voice exercises leading up to these voice issues; however, he has resumed them intermittently over the past couple weeks.  He attempts to focus on forward focus vibration for a couple of minutes a few times per day.  He feels that these were helpful in the moment.      OBJECTIVE/ASSESSMENT:    Patient Supplied Answers To Last 2 VHI Questionnaires      3/23/2023     1:43 PM 5/16/2023     1:33 PM   Voice Handicap Index (VHI-10)   My voice makes it difficult for people to hear me 2 2   People have difficulty understanding me in a noisy room 0 2   My voice difficulties restrict my personal and social life.  2 2   I feel left out of conversations because of my voice 2 2   My voice problem causes me to lose income 2 2   I feel as though I have to strain to produce voice 2 2   The clarity of my voice is unpredictable 2 2   My voice problem upsets me 2 2   My voice makes me feel handicapped 2 2   People ask, \"What's wrong with your voice?\" 2 2   VHI-10 18 20     Patient Supplied Answers To Last 2 CSI Questionnaires      12/20/2022     2:52 PM   Cough Severity Index (CSI)   My cough is worse when I lie down 0   My coughing problem causes me to restrict my personal and social life 0   I tend to avoid places because of my cough problem " 0   I feel embarrassed because of my coughing problem 0   People ask, ''What's wrong?'' because I cough a lot 0   I run out of air when I cough 0   My coughing problem affects my voice 0   My coughing problem limits my physical activity 0   My coughing problem upsets me 0   People ask me if I am sick because I cough a lot 0   CSI Score 0     Patient Supplied Answers To Last 2 EAT Questionnaires      12/20/2022     2:52 PM   Eating Assessment Tool (EAT-10)   My swallowing problem has caused me to lose weight 0   My swallowing problem interferes with my ability to go out for meals 0   Swallowing liquids takes extra effort 0   Swallowing solids takes extra effort 2   Swallowing pills takes extra effort 0   Swallowing is painful 0   The pleasure of eating is affected by my swallowing 2   When I swallow food sticks in my throat 0   I cough when I eat 0   Swallowing is stressful 1   EAT-10 5       THERAPEUTIC ACTIVITIES:  Semi-Occluded Vocal Tract Exercises (SOVTs) are a series of exercises aimed to recoordinate respiration, phonation, and resonance to produce an effortless, clear voice. Such exercises include straw phonation with or without water resistance, lip trills, humming/Basic Training Gesture for Resonant Voice Therapy, and transoral lip buzz/Basic Training Gesture for Vocal Function Exercises at comfortable speaking pitches and glissando tasks. These exercises were reviewed today, and Ravin performed them with high independent accuracy.  He denied feeling throat tension while performing straw phonation with water resistance exercises.  A slight decrease in volume led to improved accuracy.  Similar to at the beginning of our course of therapy, Ravin was very focused on his throat tension, and it was helpful for him to think more about forward focus vibrations and hyper fixating on sometimes slight throat sensations.    Resonant Voice Therapy (RVT) involves training voice-disordered individuals to produce voice in an  easier, more resonant and forward-focused manner throughout the speech hierarchy. The objective of this approach is to achieve the strongest possible voice with the least effort and impact stress between the vocal folds to minimize the likelihood of injury. These exercises were reviewed today from the phoneme to word level, and Ravin performed them with appropriate accuracy when given clinician cueing and modeling.  Intermittent coughing was noted throughout today's session.      IMPRESSIONS:   Dysphonia (R49.0) secondary to laryngeal hyperfunction (J38.7)    Ravin reports a slight setback in voice improvements since our most recent session.  He endorses less voice exercises lately, followed by morning when he woke up with increased voice issues.  He has resumed exercises intermittently since then.  We reviewed SOVT exercises and Resonant Voice tasks to help him return to a routine with these voice exercises, as there frequently helpful when he does do them      PLAN:    Ravin will perform RVT techniques once or twice daily for a total of 10-15 minutes per day    Ravin will perform SOVT exercises multiple times daily for 2 to 3 minutes between sessions    Written instructions were provided to aid home programming via [employer]    We will plan to address circumlaryngeal massage during next session    Ravin will record his voice either on a phone melita or by leaving the a voicemail at my office number if he is experiencing a particularly bad voice day.    Ravin continues to demonstrate adequate progress toward long- and short-term goals.    Continued voice therapy services are recommended. Ravin will follow-up for additional sessions and will call to schedule when he notes his schedule. Current goals will continue to be addressed.    Ravin is in agreement with this plan of care.      SLP PLAN:  Voice SLP presence at next appointment with laryngologist (e.g. Dr. Reyes, Dr. Aguilar, Dr. Denis) will be needed but has not been  scheduled      BILLING SUMMARY:    Total treatment time: 47 minutes (including 9 minutes for chart review and preparation, interpretation of the session, documentation, and billing)     Speech Pathology Treatment ([id number])      Aurora Velasquez MS CCC-SLP  Speech-Language Pathologist  [facility] Voice [facility] - Department of Otolaryngology  [facility]  [email or web address]  [phone]    *This note may have been completed using [id number] dictation software, so errors may exist. Please contact me for clarification if needed*

## 2023-05-16 ENCOUNTER — VIRTUAL VISIT (OUTPATIENT)
Dept: OTOLARYNGOLOGY | Facility: CLINIC | Age: 33
End: 2023-05-16
Payer: COMMERCIAL

## 2023-05-16 DIAGNOSIS — R49.0 DYSPHONIA: Primary | ICD-10-CM

## 2023-05-16 DIAGNOSIS — J38.7 LARYNGEAL HYPERFUNCTION: ICD-10-CM

## 2023-05-16 PROCEDURE — 92507 TX SP LANG VOICE COMM INDIV: CPT | Mod: GN | Performed by: SPEECH-LANGUAGE PATHOLOGIST

## 2023-05-16 NOTE — LETTER
5/16/2023       RE: Ravin Yañez  [address] [place] [address]     Dear Colleague,    Thank you for referring your patient, Ravin Yañez, to the [facility] VOICE CLINIC [place] [facility]. Please see a copy of my visit note below.    [facility] VOICE [facility]  VOICE / UPPER AIRWAY TREATMENT NOTE (CPT 59521)      Patient's name: Ravin Yañez  Date of Session: 5/16/2023  Providing SLP: Aurora Velasquez MS CCC-SLP  Referring Provider: Regina Reyes MD  Insurance Coverage: [employer]  Total # of SLP Visits: 5  # of SLP Therapy Sessions: 4  Session Location: Ravin was seen via telehealth today.     The patient has been notified and verbally consented to the following statements:   This video visit will be conducted between you and your provider.  Patient has opted to conduct today's video visit vs an in-person appointment, and is not able to attend due to possible exposure to COVID-19.   If during the course of the call the provider feels a video visit is not appropriate, you will not be charged for this service.     Provider has received verbal consent for billable virtual visit from the patient? 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 Perceptually, his voice is mildly rough and strained with no observed vocal tremor or indication of spasming during sustained vowels, connected speech, or specific task-specific stimuli; however, Ravin reports that his voice is doing especially well today, so it may not be representative of other instances  "when he is demonstrating increased voice difficulties. Laryngoscopy today demonstrated excessive laryngeal tension indicated by mediolateral and anteroposterior compression during voice use and rapid, fast shaking of the laryngeal structures.  This is more indicative of a tension-based tremor rather than neurologic during today's evaluation. Therefore, additional sessions of voice therapy has been recommended to aid in the differential diagnosis process, as well as to continue to progress that he is made with the previous speech-language pathologist. Ravin plans to make recordings of himself when his voice is doing more poorly, as his voice is doing well today. Ravin is in agreement with this plan of care.        SUBJECTIVE:  Ravin reports increased voice issues over the past couple of weeks characterized by throat tension, effort tremor, and difficulty with onset of phonation.  He denies an inciting factor, noting that he woke up one morning with voice issues.  He endorses that he has been inconsistent with his voice exercises leading up to these voice issues; however, he has resumed them intermittently over the past couple weeks.  He attempts to focus on forward focus vibration for a couple of minutes a few times per day.  He feels that these were helpful in the moment.      OBJECTIVE/ASSESSMENT:    Patient Supplied Answers To Last 2 VHI Questionnaires      3/23/2023     1:43 PM 5/16/2023     1:33 PM   Voice Handicap Index (VHI-10)   My voice makes it difficult for people to hear me 2 2   People have difficulty understanding me in a noisy room 0 2   My voice difficulties restrict my personal and social life.  2 2   I feel left out of conversations because of my voice 2 2   My voice problem causes me to lose income 2 2   I feel as though I have to strain to produce voice 2 2   The clarity of my voice is unpredictable 2 2   My voice problem upsets me 2 2   My voice makes me feel handicapped 2 2   People ask, \"What's wrong " "with your voice?\" 2 2   VHI-10 18 20     Patient Supplied Answers To Last 2 CSI Questionnaires      12/20/2022     2:52 PM   Cough Severity Index (CSI)   My cough is worse when I lie down 0   My coughing problem causes me to restrict my personal and social life 0   I tend to avoid places because of my cough problem 0   I feel embarrassed because of my coughing problem 0   People ask, ''What's wrong?'' because I cough a lot 0   I run out of air when I cough 0   My coughing problem affects my voice 0   My coughing problem limits my physical activity 0   My coughing problem upsets me 0   People ask me if I am sick because I cough a lot 0   CSI Score 0     Patient Supplied Answers To Last 2 EAT Questionnaires      12/20/2022     2:52 PM   Eating Assessment Tool (EAT-10)   My swallowing problem has caused me to lose weight 0   My swallowing problem interferes with my ability to go out for meals 0   Swallowing liquids takes extra effort 0   Swallowing solids takes extra effort 2   Swallowing pills takes extra effort 0   Swallowing is painful 0   The pleasure of eating is affected by my swallowing 2   When I swallow food sticks in my throat 0   I cough when I eat 0   Swallowing is stressful 1   EAT-10 5       THERAPEUTIC ACTIVITIES:  Semi-Occluded Vocal Tract Exercises (SOVTs) are a series of exercises aimed to recoordinate respiration, phonation, and resonance to produce an effortless, clear voice. Such exercises include straw phonation with or without water resistance, lip trills, humming/Basic Training Gesture for Resonant Voice Therapy, and transoral lip buzz/Basic Training Gesture for Vocal Function Exercises at comfortable speaking pitches and glissando tasks. These exercises were reviewed today, and Ravin performed them with high independent accuracy.  He denied feeling throat tension while performing straw phonation with water resistance exercises.  A slight decrease in volume led to improved accuracy.  Similar to at " the beginning of our course of therapy, Ravin was very focused on his throat tension, and it was helpful for him to think more about forward focus vibrations and hyper fixating on sometimes slight throat sensations.    Resonant Voice Therapy (RVT) involves training voice-disordered individuals to produce voice in an easier, more resonant and forward-focused manner throughout the speech hierarchy. The objective of this approach is to achieve the strongest possible voice with the least effort and impact stress between the vocal folds to minimize the likelihood of injury. These exercises were reviewed today from the phoneme to word level, and Ravin performed them with appropriate accuracy when given clinician cueing and modeling.  Intermittent coughing was noted throughout today's session.      IMPRESSIONS:   Dysphonia (R49.0) secondary to laryngeal hyperfunction (J38.7)    Ravin reports a slight setback in voice improvements since our most recent session.  He endorses less voice exercises lately, followed by morning when he woke up with increased voice issues.  He has resumed exercises intermittently since then.  We reviewed SOVT exercises and Resonant Voice tasks to help him return to a routine with these voice exercises, as there frequently helpful when he does do them      PLAN:  Ravin will perform RVT techniques once or twice daily for a total of 10-15 minutes per day  Ravin will perform SOVT exercises multiple times daily for 2 to 3 minutes between sessions  Written instructions were provided to aid home programming via [employer]  We will plan to address circumlaryngeal massage during next session  Ravin will record his voice either on a phone melita or by leaving the a voicemail at my office number if he is experiencing a particularly bad voice day.  Ravin continues to demonstrate adequate progress toward long- and short-term goals.  Continued voice therapy services are recommended. Ravin will follow-up for additional sessions and  will call to schedule when he notes his schedule. Current goals will continue to be addressed.  Ravin is in agreement with this plan of care.      SLP PLAN:  Voice SLP presence at next appointment with laryngologist (e.g. Dr. Reyes, Dr. Aguilar, Dr. Denis) will be needed but has not been scheduled      BILLING SUMMARY:  Total treatment time: 47 minutes (including 9 minutes for chart review and preparation, interpretation of the session, documentation, and billing)   Speech Pathology Treatment (44711)      Aurora Velasquez MS CCC-SLP  Speech-Language Pathologist  [facility] Voice [facility] - Department of Otolaryngology  [facility]  [email or web address]  [phone]    *This note may have been completed using [id number] dictation software, so errors may exist. Please contact me for clarification if needed*      Again, thank you for allowing me to participate in the care of your patient.      Sincerely,    Aurora Velasquez, SLP

## 2023-05-23 NOTE — PATIENT INSTRUCTIONS
"Semi-Occluded Vocal Tract Exercises         Sustain for 5-7 seconds each, total of 2-3 minutes, 5-6  mini  practice sessions per day    (You can do these as often as 1 minute per hour)         Straw Phonation with Water Resistance / Cup Bubbles  (1 inch of water)  Airflow  Goal: consistent, small bubbles  Voice   Air stays on, add voice using  No   with round lips and forward placement  Goal: consistent, small bubbles  Airflow into voice  Goal: consistency when turning voice on, no  catches   Pitch glides  Goal: No increase of bubbles when going up or down. Remember the  buzzy sound  - keep the voice forward  Lip Trills  (You can use fingers on either side of lips to anchor if needed)  Airflow  Goals: keep the lips vibrating, gentle airflow  Voice  Goal: Easy onset into voicing, no hard glottal attack  Airflow into voice  Goal: Smooth transition, no catches  Pitch glides  Goal: Keep airflow the same  Humming / Resonant Voice Therapy  Hummmm: feel vibration on the lips without throat straining  [id number]: keep the vibrations forward and use the \"m\" as check-ins  Buzzy \"No\" with and without Straw at comfortable speaking pitch  Feel vibrations with tight lips and no straining      Resonant Voice Therapy Exercises    Targets:   Feel vibration in mid face or in mouth (lips, teeth, cheek bones, nose, etc.) - Maximum the vibration   Make it easy - Minimize vocal effort       Spend as much time as you need on each of the following levels to have a consistently clear voice - when it is consistently clear and across variety in that step move on to the next level. You don t necessarily have to spend much time, spend as much or as little time as you need for each level. Except for Step 6 (reading aloud), spend 2-5 minutes reading out loud maintaining the clear voice.         Do all exercises twice daily, spending 5-10 minutes total on average across the exercises.  Sound level humming ( m  sound) or holding  n,   ng,  "  z,   v,   oh  or  oo  with lip buzz, etc, at your normal speaking pitch and normal speaking loudness.  Syllable repetition level -  maggie maggie maggie   momomomo   moomoomoo   mamamama , etc. or  n  + vowels, etc.  Word repetition level - money money money, Monday Monday Monday, many, many, many, many (see list of /m/ words/phrases and pick a few words)  Phrase level - Many men on the moon. Mail my money. One Monday morning, etc. (see list of /m/ words/phrases and pick a few phrases). Start with chanting and gradually add more natural intonation.  Automatic speech (over learned/rote) - counting, days of week, months of the year, ABCs, nursery rhymes, lyrics to songs you know well, etc. Start with chanting if needed as you did with exercise 4.  Read out loud - any material will do: newspaper, magazine, book, junk mail, etc. Try to spend the majority of the practice time with reading but ensure you re maintaining the improved/strong voice throughout.       Throughout the day:  Notice your voice as you re saying yes/no responses (right, all right, sure, okay, yes, no, etc.) and pull-out/fix the voice if needed using the same yes/no responses as spring boards to improve the voice. If needed, sip water, yawn, shift how you re standing/sitting, stretch, etc.        Additional /m/-loaded sentences:  Many miles  Mail my money  Pau  Endy.  Mean Marge made me mad.  Jenny marinates meat.  Maybe Pau measured.  Shant makes much money.  My mom made marmalade  Many munchkins made merry.  You mongrels may maul mice.  Meager meals made my mom moan.  More mayhem made managers mad.  Move my mom s many magazines.  Major migraines made me miserable.  My meddling may mean major misery.  Madam Regina mangled magnolias.  Mercy me!  Meet my mother.  Florian made moccasins.  Avelino Rivera met Max.  My mother makes muffins.  Mary Alice marched many miles.  My mother mailed me merchandise.  Make more tatum marmalade.  My mother makes much  money.  Monday morning must mean mishaps.  Stepan memorized measurements.  Mosquitoes may mean malaria.  Mend Rebeca s many mismatched mittens.  Mass media mocks many matters.  Modest mannerisms make me marvel.  Molten magma might migrate many miles.  Misconduct may mean many misdemeanors.

## 2024-03-10 ENCOUNTER — HEALTH MAINTENANCE LETTER (OUTPATIENT)
Age: 34
End: 2024-03-10

## 2025-03-16 ENCOUNTER — HEALTH MAINTENANCE LETTER (OUTPATIENT)
Age: 35
End: 2025-03-16